# Patient Record
Sex: MALE | Race: WHITE | Employment: FULL TIME | URBAN - METROPOLITAN AREA
[De-identification: names, ages, dates, MRNs, and addresses within clinical notes are randomized per-mention and may not be internally consistent; named-entity substitution may affect disease eponyms.]

---

## 2018-07-02 ENCOUNTER — HOSPITAL ENCOUNTER (OUTPATIENT)
Age: 23
Discharge: HOME | End: 2018-07-02
Payer: MEDICAID

## 2018-11-14 ENCOUNTER — HOSPITAL ENCOUNTER (EMERGENCY)
Age: 23
Discharge: ANOTHER HOSPITAL | End: 2018-11-14
Attending: EMERGENCY MEDICINE
Payer: COMMERCIAL

## 2018-11-14 ENCOUNTER — APPOINTMENT (OUTPATIENT)
Dept: GENERAL RADIOLOGY | Age: 23
End: 2018-11-14
Payer: COMMERCIAL

## 2018-11-14 VITALS
BODY MASS INDEX: 21.47 KG/M2 | OXYGEN SATURATION: 98 % | TEMPERATURE: 98.2 F | SYSTOLIC BLOOD PRESSURE: 140 MMHG | HEART RATE: 99 BPM | WEIGHT: 150 LBS | HEIGHT: 70 IN | RESPIRATION RATE: 20 BRPM | DIASTOLIC BLOOD PRESSURE: 77 MMHG

## 2018-11-14 DIAGNOSIS — T50.902A INTENTIONAL DRUG OVERDOSE, INITIAL ENCOUNTER (HCC): Primary | ICD-10-CM

## 2018-11-14 DIAGNOSIS — R45.851 SUICIDAL IDEATION: ICD-10-CM

## 2018-11-14 LAB
ACETAMINOPHEN LEVEL: <5 MCG/ML (ref 10–30)
ALBUMIN SERPL-MCNC: 4.9 G/DL (ref 3.5–5.2)
ALP BLD-CCNC: 77 U/L (ref 40–129)
ALT SERPL-CCNC: 9 U/L (ref 0–40)
AMPHETAMINE SCREEN, URINE: POSITIVE
ANION GAP SERPL CALCULATED.3IONS-SCNC: 17 MMOL/L (ref 7–16)
AST SERPL-CCNC: 15 U/L (ref 0–39)
BARBITURATE SCREEN URINE: NOT DETECTED
BASOPHILS ABSOLUTE: 0.03 E9/L (ref 0–0.2)
BASOPHILS RELATIVE PERCENT: 0.3 % (ref 0–2)
BENZODIAZEPINE SCREEN, URINE: NOT DETECTED
BILIRUB SERPL-MCNC: 0.5 MG/DL (ref 0–1.2)
BILIRUBIN DIRECT: <0.2 MG/DL (ref 0–0.3)
BILIRUBIN, INDIRECT: NORMAL MG/DL (ref 0–1)
BUN BLDV-MCNC: 5 MG/DL (ref 6–20)
CALCIUM SERPL-MCNC: 9.6 MG/DL (ref 8.6–10.2)
CANNABINOID SCREEN URINE: POSITIVE
CHLORIDE BLD-SCNC: 95 MMOL/L (ref 98–107)
CHP ED QC CHECK: NORMAL
CO2: 24 MMOL/L (ref 22–29)
COCAINE METABOLITE SCREEN URINE: NOT DETECTED
CREAT SERPL-MCNC: 0.8 MG/DL (ref 0.7–1.2)
EKG ATRIAL RATE: 109 BPM
EKG P AXIS: 70 DEGREES
EKG P-R INTERVAL: 194 MS
EKG Q-T INTERVAL: 326 MS
EKG QRS DURATION: 84 MS
EKG QTC CALCULATION (BAZETT): 439 MS
EKG R AXIS: 85 DEGREES
EKG T AXIS: 52 DEGREES
EKG VENTRICULAR RATE: 109 BPM
EOSINOPHILS ABSOLUTE: 0.02 E9/L (ref 0.05–0.5)
EOSINOPHILS RELATIVE PERCENT: 0.2 % (ref 0–6)
ETHANOL: <10 MG/DL (ref 0–0.08)
GFR AFRICAN AMERICAN: >60
GFR NON-AFRICAN AMERICAN: >60 ML/MIN/1.73
GLUCOSE BLD-MCNC: 112 MG/DL
GLUCOSE BLD-MCNC: 112 MG/DL (ref 74–99)
HCT VFR BLD CALC: 47.2 % (ref 37–54)
HEMOGLOBIN: 16.5 G/DL (ref 12.5–16.5)
IMMATURE GRANULOCYTES #: 0.06 E9/L
IMMATURE GRANULOCYTES %: 0.6 % (ref 0–5)
LIPASE: 10 U/L (ref 13–60)
LYMPHOCYTES ABSOLUTE: 1.33 E9/L (ref 1.5–4)
LYMPHOCYTES RELATIVE PERCENT: 12.2 % (ref 20–42)
MAGNESIUM: 1.9 MG/DL (ref 1.6–2.6)
MCH RBC QN AUTO: 29.6 PG (ref 26–35)
MCHC RBC AUTO-ENTMCNC: 35 % (ref 32–34.5)
MCV RBC AUTO: 84.6 FL (ref 80–99.9)
METHADONE SCREEN, URINE: NOT DETECTED
MONOCYTES ABSOLUTE: 1.22 E9/L (ref 0.1–0.95)
MONOCYTES RELATIVE PERCENT: 11.2 % (ref 2–12)
NEUTROPHILS ABSOLUTE: 8.2 E9/L (ref 1.8–7.3)
NEUTROPHILS RELATIVE PERCENT: 75.5 % (ref 43–80)
OPIATE SCREEN URINE: NOT DETECTED
PDW BLD-RTO: 12 FL (ref 11.5–15)
PHENCYCLIDINE SCREEN URINE: NOT DETECTED
PLATELET # BLD: 338 E9/L (ref 130–450)
PMV BLD AUTO: 9.4 FL (ref 7–12)
POTASSIUM SERPL-SCNC: 3.3 MMOL/L (ref 3.5–5)
PROPOXYPHENE SCREEN: NOT DETECTED
RBC # BLD: 5.58 E12/L (ref 3.8–5.8)
SALICYLATE, SERUM: <0.3 MG/DL (ref 0–30)
SODIUM BLD-SCNC: 136 MMOL/L (ref 132–146)
TOTAL CK: 76 U/L (ref 20–200)
TOTAL PROTEIN: 8.1 G/DL (ref 6.4–8.3)
TRICYCLIC ANTIDEPRESSANTS SCREEN SERUM: NEGATIVE NG/ML
TROPONIN: <0.01 NG/ML (ref 0–0.03)
TSH SERPL DL<=0.05 MIU/L-ACNC: 2.22 UIU/ML (ref 0.27–4.2)
WBC # BLD: 10.9 E9/L (ref 4.5–11.5)

## 2018-11-14 PROCEDURE — 93005 ELECTROCARDIOGRAM TRACING: CPT | Performed by: EMERGENCY MEDICINE

## 2018-11-14 PROCEDURE — 80048 BASIC METABOLIC PNL TOTAL CA: CPT

## 2018-11-14 PROCEDURE — 80076 HEPATIC FUNCTION PANEL: CPT

## 2018-11-14 PROCEDURE — 82550 ASSAY OF CK (CPK): CPT

## 2018-11-14 PROCEDURE — 99284 EMERGENCY DEPT VISIT MOD MDM: CPT

## 2018-11-14 PROCEDURE — 96374 THER/PROPH/DIAG INJ IV PUSH: CPT

## 2018-11-14 PROCEDURE — G0480 DRUG TEST DEF 1-7 CLASSES: HCPCS

## 2018-11-14 PROCEDURE — 84484 ASSAY OF TROPONIN QUANT: CPT

## 2018-11-14 PROCEDURE — 83690 ASSAY OF LIPASE: CPT

## 2018-11-14 PROCEDURE — 83735 ASSAY OF MAGNESIUM: CPT

## 2018-11-14 PROCEDURE — 6370000000 HC RX 637 (ALT 250 FOR IP): Performed by: EMERGENCY MEDICINE

## 2018-11-14 PROCEDURE — 71045 X-RAY EXAM CHEST 1 VIEW: CPT

## 2018-11-14 PROCEDURE — 80307 DRUG TEST PRSMV CHEM ANLYZR: CPT

## 2018-11-14 PROCEDURE — 2580000003 HC RX 258: Performed by: EMERGENCY MEDICINE

## 2018-11-14 PROCEDURE — 84443 ASSAY THYROID STIM HORMONE: CPT

## 2018-11-14 PROCEDURE — 85025 COMPLETE CBC W/AUTO DIFF WBC: CPT

## 2018-11-14 PROCEDURE — 6360000002 HC RX W HCPCS: Performed by: EMERGENCY MEDICINE

## 2018-11-14 RX ORDER — SODIUM CHLORIDE 9 MG/ML
INJECTION, SOLUTION INTRAVENOUS ONCE
Status: COMPLETED | OUTPATIENT
Start: 2018-11-14 | End: 2018-11-14

## 2018-11-14 RX ORDER — DEXTROAMPHETAMINE SACCHARATE, AMPHETAMINE ASPARTATE, DEXTROAMPHETAMINE SULFATE AND AMPHETAMINE SULFATE 7.5; 7.5; 7.5; 7.5 MG/1; MG/1; MG/1; MG/1
30 TABLET ORAL DAILY
COMMUNITY

## 2018-11-14 RX ORDER — POTASSIUM CHLORIDE 20 MEQ/1
40 TABLET, EXTENDED RELEASE ORAL ONCE
Status: COMPLETED | OUTPATIENT
Start: 2018-11-14 | End: 2018-11-14

## 2018-11-14 RX ORDER — LORAZEPAM 2 MG/ML
1 INJECTION INTRAMUSCULAR ONCE
Status: COMPLETED | OUTPATIENT
Start: 2018-11-14 | End: 2018-11-14

## 2018-11-14 RX ADMIN — SODIUM CHLORIDE: 9 INJECTION, SOLUTION INTRAVENOUS at 10:18

## 2018-11-14 RX ADMIN — LORAZEPAM 1 MG: 2 INJECTION INTRAMUSCULAR; INTRAVENOUS at 10:21

## 2018-11-14 RX ADMIN — SODIUM CHLORIDE: 9 INJECTION, SOLUTION INTRAVENOUS at 10:19

## 2018-11-14 RX ADMIN — POTASSIUM CHLORIDE 40 MEQ: 20 TABLET, EXTENDED RELEASE ORAL at 17:14

## 2018-11-14 ASSESSMENT — ENCOUNTER SYMPTOMS
EYE DISCHARGE: 0
SINUS PRESSURE: 0
NAUSEA: 0
DIARRHEA: 0
EYE REDNESS: 0
BACK PAIN: 0
VOMITING: 0
COUGH: 0
ABDOMINAL PAIN: 0
SORE THROAT: 0
EYE PAIN: 0
WHEEZING: 0
SHORTNESS OF BREATH: 0

## 2018-11-14 NOTE — ED PROVIDER NOTES
Patient is a 81-year-old male presenting to ED with complaint of drug overdose. Patient took 9 pills of 30 mg Adderall tabs. Patient took them over the course of 6 hours states that he recently was kicked out of school and recently obtained a job though has felt list. Patient has been clinically depressed is a history of bipolar disorder. Patient states that no medications for bipolar disorder though he sees a consult on a regular basis. Patient reports that tonight with his regular dose of 30 mg Adderall twice patient states he continued to take an additional pill every half-hour patient took a total of 9 pills. DT never felt symptoms of that though 4 hours prior to arrival he stopped taking the medication as he decided he did not want to harm himself. Patient waited till his parents were awake notified them of his medication intake and was brought to the ED. Patient denies having any symptoms currently denies nausea, fever, dictations. Patient has never attempted to kill himself in the past.            Review of Systems   Constitutional: Negative for chills and fever. HENT: Negative for ear pain, sinus pressure and sore throat. Eyes: Negative for pain, discharge and redness. Respiratory: Negative for cough, shortness of breath and wheezing. Cardiovascular: Negative for chest pain. Gastrointestinal: Negative for abdominal pain, diarrhea, nausea and vomiting. Genitourinary: Negative for dysuria and frequency. Musculoskeletal: Negative for arthralgias and back pain. Skin: Negative for rash and wound. Neurological: Negative for weakness and headaches. Hematological: Negative for adenopathy. Psychiatric/Behavioral: Positive for self-injury and suicidal ideas. All other systems reviewed and are negative. Physical Exam   Constitutional: He is oriented to person, place, and time. He appears well-developed and well-nourished. No distress. HENT:   Head: Normocephalic and atraumatic.

## 2018-11-14 NOTE — ED NOTES
This writer spoke with Dean Marrero of poison control, updated vitals and lab levels provided, Dean Marrero stated that since vitals are stable and acetaminophen level negative that it would be a 6 hr observation for the pt, this was relayed to dr Gilberto Luna, RN  11/14/18 [id number]

## 2018-11-19 LAB
AMPHETAMINES, URINE: >5000 NG/ML
CANNABINOIDS CONF, URINE: 29 NG/ML
METHAMPHETAMINE, URINE: <200 NG/ML
METHYLENEDIOXYAMPHETAMINE, UR: <200 NG/ML
METHYLENEDIOXYETHYLAMPHETAMINE, UR: <200 NG/ML
METHYLENEDIOXYMETHAMPHETAMINE, UR: <200 NG/ML

## 2020-07-29 ENCOUNTER — APPOINTMENT (OUTPATIENT)
Dept: GENERAL RADIOLOGY | Age: 25
End: 2020-07-29
Payer: COMMERCIAL

## 2020-07-29 ENCOUNTER — HOSPITAL ENCOUNTER (EMERGENCY)
Age: 25
Discharge: HOME | End: 2020-07-29
Attending: EMERGENCY MEDICINE
Payer: COMMERCIAL

## 2020-07-29 VITALS
HEART RATE: 88 BPM | BODY MASS INDEX: 21.47 KG/M2 | TEMPERATURE: 98.8 F | HEIGHT: 70 IN | SYSTOLIC BLOOD PRESSURE: 144 MMHG | DIASTOLIC BLOOD PRESSURE: 91 MMHG | RESPIRATION RATE: 16 BRPM | WEIGHT: 150 LBS | OXYGEN SATURATION: 99 %

## 2020-07-29 LAB
ALBUMIN SERPL-MCNC: 4.8 G/DL (ref 3.5–5.2)
ALP BLD-CCNC: 89 U/L (ref 40–129)
ALT SERPL-CCNC: 14 U/L (ref 0–40)
ANION GAP SERPL CALCULATED.3IONS-SCNC: 11 MMOL/L (ref 7–16)
AST SERPL-CCNC: 18 U/L (ref 0–39)
BASOPHILS ABSOLUTE: 0.03 E9/L (ref 0–0.2)
BASOPHILS RELATIVE PERCENT: 0.6 % (ref 0–2)
BILIRUB SERPL-MCNC: 0.6 MG/DL (ref 0–1.2)
BUN BLDV-MCNC: 9 MG/DL (ref 6–20)
CALCIUM SERPL-MCNC: 9.7 MG/DL (ref 8.6–10.2)
CHLORIDE BLD-SCNC: 104 MMOL/L (ref 98–107)
CO2: 26 MMOL/L (ref 22–29)
CREAT SERPL-MCNC: 0.9 MG/DL (ref 0.7–1.2)
D DIMER: <200 NG/ML DDU
EOSINOPHILS ABSOLUTE: 0.04 E9/L (ref 0.05–0.5)
EOSINOPHILS RELATIVE PERCENT: 0.8 % (ref 0–6)
GFR AFRICAN AMERICAN: >60
GFR NON-AFRICAN AMERICAN: >60 ML/MIN/1.73
GLUCOSE BLD-MCNC: 109 MG/DL (ref 74–99)
HCT VFR BLD CALC: 46.6 % (ref 37–54)
HEMOGLOBIN: 15.7 G/DL (ref 12.5–16.5)
IMMATURE GRANULOCYTES #: 0.03 E9/L
IMMATURE GRANULOCYTES %: 0.6 % (ref 0–5)
LYMPHOCYTES ABSOLUTE: 1.46 E9/L (ref 1.5–4)
LYMPHOCYTES RELATIVE PERCENT: 27.5 % (ref 20–42)
MCH RBC QN AUTO: 30.5 PG (ref 26–35)
MCHC RBC AUTO-ENTMCNC: 33.7 % (ref 32–34.5)
MCV RBC AUTO: 90.5 FL (ref 80–99.9)
MONOCYTES ABSOLUTE: 0.58 E9/L (ref 0.1–0.95)
MONOCYTES RELATIVE PERCENT: 10.9 % (ref 2–12)
NEUTROPHILS ABSOLUTE: 3.17 E9/L (ref 1.8–7.3)
NEUTROPHILS RELATIVE PERCENT: 59.6 % (ref 43–80)
PDW BLD-RTO: 12.8 FL (ref 11.5–15)
PLATELET # BLD: 307 E9/L (ref 130–450)
PMV BLD AUTO: 9.9 FL (ref 7–12)
POTASSIUM REFLEX MAGNESIUM: 4.3 MMOL/L (ref 3.5–5)
RBC # BLD: 5.15 E12/L (ref 3.8–5.8)
SODIUM BLD-SCNC: 141 MMOL/L (ref 132–146)
TOTAL PROTEIN: 7.2 G/DL (ref 6.4–8.3)
TROPONIN: <0.01 NG/ML (ref 0–0.03)
WBC # BLD: 5.3 E9/L (ref 4.5–11.5)

## 2020-07-29 PROCEDURE — 93005 ELECTROCARDIOGRAM TRACING: CPT | Performed by: NURSE PRACTITIONER

## 2020-07-29 PROCEDURE — 99285 EMERGENCY DEPT VISIT HI MDM: CPT

## 2020-07-29 PROCEDURE — 71046 X-RAY EXAM CHEST 2 VIEWS: CPT

## 2020-07-29 PROCEDURE — 85025 COMPLETE CBC W/AUTO DIFF WBC: CPT

## 2020-07-29 PROCEDURE — 85378 FIBRIN DEGRADE SEMIQUANT: CPT

## 2020-07-29 PROCEDURE — 84484 ASSAY OF TROPONIN QUANT: CPT

## 2020-07-29 PROCEDURE — 80053 COMPREHEN METABOLIC PANEL: CPT

## 2020-07-29 ASSESSMENT — ENCOUNTER SYMPTOMS
DIARRHEA: 0
CHEST TIGHTNESS: 1
BLOOD IN STOOL: 0
BACK PAIN: 0
ABDOMINAL PAIN: 0
CONSTIPATION: 0
RHINORRHEA: 0
SORE THROAT: 0
VOMITING: 0
COUGH: 0
SHORTNESS OF BREATH: 0
NAUSEA: 0

## 2020-07-29 ASSESSMENT — PAIN DESCRIPTION - DESCRIPTORS: DESCRIPTORS: PRESSURE

## 2020-07-29 ASSESSMENT — PAIN DESCRIPTION - FREQUENCY: FREQUENCY: CONTINUOUS

## 2020-07-29 ASSESSMENT — PAIN DESCRIPTION - LOCATION: LOCATION: CHEST

## 2020-07-29 ASSESSMENT — PAIN SCALES - GENERAL: PAINLEVEL_OUTOF10: 7

## 2020-07-29 NOTE — ED NOTES
Pt called for in waiting room multiple times bathroom checked.   Pt unable to be found at this time      Marcela Crespo RN  07/29/20 [id number]

## 2020-07-29 NOTE — ED PROVIDER NOTES
Reji Méndez is a 22 y.o. male with a PMHx significant for Anxiety and asthma who presents for evaluation of chest pain / pressure, beginning months ago. The complaint has been intermittent, moderate in severity, and worsened by nothing. The patient states that he has noticed chest pressure and discomfort on and off for the past few months. He states for the past 4 weeks is been getting progressively worse. He states it feels like chest pressure right behind his mid chest.  The pain does not radiate elsewhere, does not go to his back does not radiate down his arm does not cholesterol. He states it has been a daily occurrence and sometimes notices shortness of breath with it. Patient states this does not feel like his normal asthma, he notes that he also has anxiety and states is been getting worse as well. Patient denies any IV drug use, recent car rides, hemoptysis, recent surgery. The history is provided by the patient and medical records. Review of Systems   Constitutional: Negative for chills and fever. HENT: Negative for postnasal drip, rhinorrhea and sore throat. Eyes: Negative for visual disturbance. Respiratory: Positive for chest tightness. Negative for cough and shortness of breath. Cardiovascular: Positive for chest pain. Gastrointestinal: Negative for abdominal pain, blood in stool, constipation, diarrhea, nausea and vomiting. Genitourinary: Negative for difficulty urinating, dysuria and urgency. Musculoskeletal: Negative for back pain. Skin: Negative for rash. Neurological: Negative for dizziness, numbness and headaches. Physical Exam  Vitals signs and nursing note reviewed. Constitutional:       Appearance: He is well-developed. HENT:      Head: Normocephalic and atraumatic. Right Ear: External ear normal.      Left Ear: External ear normal.   Eyes:      General:         Right eye: No discharge. Left eye: No discharge.       Extraocular Movements: Extraocular movements intact. Conjunctiva/sclera: Conjunctivae normal.   Neck:      Musculoskeletal: Normal range of motion and neck supple. Cardiovascular:      Rate and Rhythm: Normal rate and regular rhythm. Heart sounds: Normal heart sounds. No murmur. Pulmonary:      Effort: Pulmonary effort is normal. No respiratory distress. Breath sounds: Normal breath sounds. No stridor. No wheezing. Abdominal:      General: There is no distension. Palpations: Abdomen is soft. There is no mass. Tenderness: There is no abdominal tenderness. There is no rebound. Musculoskeletal:         General: No swelling. Skin:     General: Skin is warm and dry. Coloration: Skin is not jaundiced or pale. Neurological:      General: No focal deficit present. Mental Status: He is alert and oriented to person, place, and time. Cranial Nerves: No cranial nerve deficit. Coordination: Coordination normal.          Procedures     MDM     PERC Rule for PE:      Age ? 50 negative     HR ? 100 negative     O2 Sat on Room Air < 95% negative     Prior History of DVT/PE negative     Recent Trauma or Surgery negative     Hemoptysis negative     Exogenous Estrogen/Hormone Use negative     Unilateral Extgremity Swelling negative     * If ANY Criteria are positive, the PERC rule is not satisfied and cannot be used to rule out PE in this patient. ED Course as of Jul 29 1522   Wed Jul 29, 2020   1500 EKG: This EKG is signed and interpreted by me. Rate: 68  Rhythm: Sinus  Interpretation: no acute changes and non-specific EKG, no ST elevations or depressions, inverted T waves in V1, QTC of 399  Comparison: Non-specific changes compared to previous EKG 11/14/2018; less artifact. [BB]   1518 Went to reevaluate the patient, he is lying in bed in no acute distress. Discussed today's findings and results. He is agreeable to treatment plan of following up with his PCP.   Mother at bedside.    [BB]      ED Course User Index  [BB] DO Wilbur Collier presents to the ED for evaluation of chest discomfort. Patient was seen at outside facility and sent for further evaluation. Differential diagnoses included but not limited to Pericarditis, CAD, PE, chest wall pain. Workup in the ED revealed negative CXR and labs. Patient with negative D-dimer. Patient continues to be non-toxic on re-evaluation. Findings were discussed with the patient and reasons to immediately return to the ED were articulated to them. They will follow-up with their PCP.    --------------------------------------------- PAST HISTORY ---------------------------------------------  Past Medical History:  has a past medical history of ADHD, Anxiety, and Bipolar 1 disorder ([facility] [address] Past Surgical History:  has no past surgical history on file. Social History:  reports that he has been smoking cigars. He has never used smokeless tobacco. He reports current alcohol use. He reports current drug use. Drug: Marijuana. Family History: family history is not on file. The patients home medications have been reviewed. Allergies: Patient has no known allergies.     -------------------------------------------------- RESULTS -------------------------------------------------  Labs:  Results for orders placed or performed during the hospital encounter of 07/29/20   CBC Auto Differential   Result Value Ref Range    WBC 5.3 4.5 - 11.5 E9/L    RBC 5.15 3.80 - 5.80 E12/L    Hemoglobin 15.7 12.5 - 16.5 g/dL    Hematocrit 46.6 37.0 - 54.0 %    MCV 90.5 80.0 - 99.9 fL    MCH 30.5 26.0 - 35.0 pg    MCHC 33.7 32.0 - 34.5 %    RDW 12.8 11.5 - 15.0 fL    Platelets 174 175 - 583 E9/L    MPV 9.9 7.0 - 12.0 fL    Neutrophils % 59.6 43.0 - 80.0 %    Immature Granulocytes % 0.6 0.0 - 5.0 %    Lymphocytes % 27.5 20.0 - 42.0 %    Monocytes % 10.9 2.0 - 12.0 %    Eosinophils % 0.8 0.0 - 6.0 %    Basophils % 0.6 0.0 - 2.0 % Neutrophils Absolute 3.17 1.80 - 7.30 E9/L    Immature Granulocytes # 0.03 E9/L    Lymphocytes Absolute 1.46 (L) 1.50 - 4.00 E9/L    Monocytes Absolute 0.58 0.10 - 0.95 E9/L    Eosinophils Absolute 0.04 (L) 0.05 - 0.50 E9/L    Basophils Absolute 0.03 0.00 - 0.20 E9/L   Comprehensive Metabolic Panel w/ Reflex to MG   Result Value Ref Range    Sodium 141 132 - 146 mmol/L    Potassium reflex Magnesium 4.3 3.5 - 5.0 mmol/L    Chloride 104 98 - 107 mmol/L    CO2 26 22 - 29 mmol/L    Anion Gap 11 7 - 16 mmol/L    Glucose 109 (H) 74 - 99 mg/dL    BUN 9 6 - 20 mg/dL    CREATININE 0.9 0.7 - 1.2 mg/dL    GFR Non-African American >60 >=60 mL/min/1.73    GFR African American >60     Calcium 9.7 8.6 - 10.2 mg/dL    Total Protein 7.2 6.4 - 8.3 g/dL    Alb 4.8 3.5 - 5.2 g/dL    Total Bilirubin 0.6 0.0 - 1.2 mg/dL    Alkaline Phosphatase 89 40 - 129 U/L    ALT 14 0 - 40 U/L    AST 18 0 - 39 U/L   Troponin   Result Value Ref Range    Troponin <0.01 0.00 - 0.03 ng/mL   D-dimer, quantitative   Result Value Ref Range    D-Dimer, Quant <200 ng/mL DDU       Radiology:  XR CHEST (2 VW)   Final Result   No sign of acute pulmonary process. ------------------------- NURSING NOTES AND VITALS REVIEWED ---------------------------  Date / Time Roomed:  7/29/2020 12:31 PM  ED Bed Assignment:  [facility]/    The nursing notes within the ED encounter and vital signs as below have been reviewed. BP (!) 144/91   Pulse 88   Temp 98.8 °F (37.1 °C) (Temporal)   Resp 16   Ht 5' 10\" (1.778 m)   Wt 150 lb (68 kg)   SpO2 99%   BMI 21.52 kg/m²   Oxygen Saturation Interpretation: Normal      ------------------------------------------ PROGRESS NOTES ------------------------------------------  4:39 PM EDT  I have spoken with the patient and discussed todays results, in addition to providing specific details for the plan of care and counseling regarding the diagnosis and prognosis.   Their questions are answered at this time and they are agreeable with the plan. I discussed at length with them reasons for immediate return here for re evaluation. They will followup with their primary care physician by calling their office tomorrow. --------------------------------- ADDITIONAL PROVIDER NOTES ---------------------------------  At this time the patient is without objective evidence of an acute process requiring hospitalization or inpatient management. They have remained hemodynamically stable throughout their entire ED visit and are stable for discharge with outpatient follow-up. The plan has been discussed in detail and they are aware of the specific conditions for emergent return, as well as the importance of follow-up. Discharge Medication List as of 7/29/2020  3:18 PM          Diagnosis:  1. Chest pain, unspecified type        Disposition:  Patient's disposition: Discharge to home  Patient's condition is stable.            Angel Cortez DO  Resident  07/29/20 1640

## 2020-07-31 LAB
EKG ATRIAL RATE: 68 BPM
EKG P AXIS: 80 DEGREES
EKG P-R INTERVAL: 152 MS
EKG Q-T INTERVAL: 376 MS
EKG QRS DURATION: 98 MS
EKG QTC CALCULATION (BAZETT): 399 MS
EKG R AXIS: 86 DEGREES
EKG T AXIS: 70 DEGREES
EKG VENTRICULAR RATE: 68 BPM

## 2022-01-30 ENCOUNTER — HOSPITAL ENCOUNTER (EMERGENCY)
Age: 27
Discharge: HOME | End: 2022-01-30
Attending: STUDENT IN AN ORGANIZED HEALTH CARE EDUCATION/TRAINING PROGRAM
Payer: MEDICAID

## 2022-01-30 VITALS
WEIGHT: 140 LBS | BODY MASS INDEX: 20.04 KG/M2 | OXYGEN SATURATION: 98 % | HEART RATE: 87 BPM | RESPIRATION RATE: 14 BRPM | DIASTOLIC BLOOD PRESSURE: 58 MMHG | SYSTOLIC BLOOD PRESSURE: 116 MMHG | TEMPERATURE: 97.2 F | HEIGHT: 70 IN

## 2022-01-30 DIAGNOSIS — F41.0 ANXIETY ATTACK: Primary | ICD-10-CM

## 2022-01-30 PROCEDURE — 6370000000 HC RX 637 (ALT 250 FOR IP): Performed by: STUDENT IN AN ORGANIZED HEALTH CARE EDUCATION/TRAINING PROGRAM

## 2022-01-30 PROCEDURE — 99285 EMERGENCY DEPT VISIT HI MDM: CPT

## 2022-01-30 RX ORDER — HYDROXYZINE 50 MG/1
50 TABLET, FILM COATED ORAL EVERY 8 HOURS PRN
Qty: 15 TABLET | Refills: 0 | Status: SHIPPED | OUTPATIENT
Start: 2022-01-30 | End: 2022-02-04

## 2022-01-30 RX ORDER — HYDROXYZINE HYDROCHLORIDE 10 MG/1
50 TABLET, FILM COATED ORAL ONCE
Status: COMPLETED | OUTPATIENT
Start: 2022-01-30 | End: 2022-01-30

## 2022-01-30 RX ADMIN — HYDROXYZINE HYDROCHLORIDE 50 MG: 10 TABLET, FILM COATED ORAL at 12:39

## 2022-01-30 ASSESSMENT — ENCOUNTER SYMPTOMS
NAUSEA: 0
ABDOMINAL PAIN: 0
VOMITING: 0
DIARRHEA: 0
RHINORRHEA: 0
SHORTNESS OF BREATH: 0

## 2022-01-30 NOTE — ED NOTES
Pt alert states that he feels very anxious today he states that he is due to see a doctor on feb 8th regarding this anxiety, he states that he currently does not have a plan to hurt himself, but he would do anything to get rid of the anxiety.      Bishop Glenna RN  01/30/22 0690

## 2022-01-30 NOTE — ED PROVIDER NOTES
Patient is a 23-year-old male with a history of anxiety symptoms who presents to the emergency department with described anxiety. Patient states he has had symptoms for greater than 5 years, he states they are daily. Today he was driving in his car when he felt overwhelming anxiety including palpitations and racing thoughts. Patient denies any SI or HI, denies any auditory or visual hallucinations. Patient denies any self-harm. Patient states that he has had anxiety symptoms in the past and was treated 5 years ago with a trial of Seroquel which he self stopped after less than a month. Patient is not followed by outpatient psychiatry. Patient is to establish with a slight outpatient psychiatry in [facility] coming up. Patient is not currently on a daily anxiolytic. Patient denies any chest pain or shortness of breath, denies any abdominal pain, any other symptoms. Patient denies any neuro deficits. Patient denies any trauma. Review of Systems   Constitutional: Negative for chills and fever. HENT: Negative for congestion and rhinorrhea. Eyes: Negative for visual disturbance. Respiratory: Negative for shortness of breath. Cardiovascular: Negative for chest pain. Gastrointestinal: Negative for abdominal pain, diarrhea, nausea and vomiting. Endocrine: Negative for polyuria. Genitourinary: Negative for dysuria, frequency, hematuria and urgency. Musculoskeletal: Negative for arthralgias and myalgias. Skin: Negative for rash and wound. Allergic/Immunologic: Negative for immunocompromised state. Neurological: Negative for dizziness, syncope, weakness, light-headedness, numbness and headaches. Psychiatric/Behavioral: Negative for agitation, confusion, hallucinations, self-injury and suicidal ideas. The patient is nervous/anxious. Physical Exam  Vitals and nursing note reviewed. Constitutional:       General: He is not in acute distress.      Appearance: He is not ill-appearing or toxic-appearing. HENT:      Head: Normocephalic and atraumatic. Mouth/Throat:      Mouth: Mucous membranes are moist.      Pharynx: Oropharynx is clear. Eyes:      Pupils: Pupils are equal, round, and reactive to light. Cardiovascular:      Rate and Rhythm: Normal rate and regular rhythm. Pulses: Normal pulses. Heart sounds: Normal heart sounds. Pulmonary:      Effort: Pulmonary effort is normal.      Breath sounds: Normal breath sounds. Abdominal:      General: There is no distension. Palpations: Abdomen is soft. Tenderness: There is no abdominal tenderness. Musculoskeletal:         General: Normal range of motion. Skin:     General: Skin is warm and dry. Capillary Refill: Capillary refill takes less than 2 seconds. Neurological:      General: No focal deficit present. Mental Status: He is alert and oriented to person, place, and time. GCS: GCS eye subscore is 4. GCS verbal subscore is 5. GCS motor subscore is 6. Sensory: Sensation is intact. No sensory deficit. Motor: Motor function is intact. Psychiatric:         Mood and Affect: Mood is anxious. Speech: Speech is rapid and pressured. Speech is not slurred or tangential.         Thought Content: Thought content does not include homicidal or suicidal ideation. Thought content does not include homicidal or suicidal plan. MDM  Number of Diagnoses or Management Options  Anxiety attack  Diagnosis management comments: Patient is a 80-year-old male who presents to the emergency department with complaint of anxiety. Patient has a history of anxiety symptoms, is going to establish with psychiatry. Patient is not currently on any anxiety lytics. Patient complains of palpitations and feeling of anxiety. Patient denies SI or HI. Patient presents awake, alert, anxious. Vital signs are noted. Physical exam is unremarkable. EKG shows no acute findings.   Patient's symptoms are consistent with anxiety. Patient was agreeable to try anxiolytic including Vistaril, which was given and patient was reevaluated during ED monitoring positive improvement. Patient was feeling more comfortable. Patient plans on following up outpatient. Patient is agreeable to plan of discharge home with home monitoring and and Atarax as needed for continued symptoms. Patient will call to try to get into an appointment sooner outpatient psychiatry. Plan was discussed with patient and father at bedside, they are both agreeable. Return instructions were given for the emergency department including SI and HI. Patient is agreeable to plan       EKG: This EKG is signed and interpreted by me. Rate: 64  Rhythm: Sinus  Interpretation: sinus rhythm, normal W[place] interval, normal QRS, normal QT interval, no acute ST or T wave changes. Signs of early repol. Comparison: stable as compared to patient's most recent EKG     --------------------------------------------- PAST HISTORY ---------------------------------------------  Past Medical History:  has a past medical history of ADHD, Anxiety, and Bipolar 1 disorder ([address] Past Surgical History:  has no past surgical history on file. Social History:  reports that he has been smoking cigars. He has never used smokeless tobacco. He reports current alcohol use. He reports current drug use. Drug: Marijuana Kishore Sneha). Family History: family history is not on file. The patients home medications have been reviewed. Allergies: Patient has no known allergies.     -------------------------------------------------- RESULTS -------------------------------------------------  Labs:  Results for orders placed or performed during the hospital encounter of 01/30/22   EKG 12 Lead   Result Value Ref Range    Ventricular Rate 64 BPM    Atrial Rate 64 BPM    P-R Interval 160 ms    QRS Duration 98 ms    Q-T Interval 416 ms    QTc Calculation (Bazett) 429 ms    P Axis 79 degrees    R Axis 82 degrees    T Axis 71 degrees       Radiology:  No orders to display     ------------------------- NURSING NOTES AND VITALS REVIEWED ---------------------------  Date / Time Roomed:  1/30/2022 11:36 AM  ED Bed Assignment:  02/02    The nursing notes within the ED encounter and vital signs as below have been reviewed. BP (!) 116/58   Pulse 87   Temp 97.2 °F (36.2 °C)   Resp 14   Ht 5' 10\" (1.778 m)   Wt 140 lb (63.5 kg)   SpO2 98%   BMI 20.09 kg/m²   Oxygen Saturation Interpretation: Normal      ------------------------------------------ PROGRESS NOTES ------------------------------------------  12:23 PM EST  I have spoken with the patient and discussed todays results, in addition to providing specific details for the plan of care and counseling regarding the diagnosis and prognosis. Their questions are answered at this time and they are agreeable with the plan. I discussed at length with them reasons for immediate return here for re evaluation. They will followup with outpatient psych to establish as previously referred. --------------------------------- ADDITIONAL PROVIDER NOTES ---------------------------------  At this time the patient is without objective evidence of an acute process requiring hospitalization or inpatient management. They have remained hemodynamically stable throughout their entire ED visit and are stable for discharge with outpatient follow-up. The plan has been discussed in detail and they are aware of the specific conditions for emergent return, as well as the importance of follow-up. Discharge Medication List as of 1/30/2022  1:24 PM      START taking these medications    Details   hydrOXYzine (ATARAX) 50 MG tablet Take 1 tablet by mouth every 8 hours as needed for Anxiety, Disp-15 tablet, R-0Print           Diagnosis:  1. Anxiety attack      Disposition:  Patient's disposition: Discharge to home  Patient's condition is stable.     1/30/22, 12:23 PM EST.    This note is prepared by Claribel Beltran MD -PGY-2                   Claribel Beltran MD  Resident  01/30/22 9717

## 2022-01-30 NOTE — ED NOTES
Reviewed discharge instructions with patient, discussed medications and addressed all patient and family questions/concerns. Pt verbalizes understanding.        Shyla Sinclair RN  01/30/22 2843

## 2022-01-30 NOTE — ED PROVIDER NOTES
He has never used smokeless tobacco. He reports current alcohol use. He reports current drug use. Drug: Marijuana Don Safer). Family History: family history is not on file. Unless otherwise noted, family history is non contributory    The patients home medications have been reviewed. Allergies: Patient has no known allergies. Physical Exam:  Constitutional: Appears in no distress  Head: Normocephalic  Eyes: No conjunctial injection  ENT: Moist mucous membranes  Neck: Trachea midline  Respiratory: Nonlabored respirations, no tachypnea  Cardiovascular: Regular rate. Regular rhythm. No murmurs, no gallops, no rubs. Gastrointestinal: Nonsistendended abdomen. Musculoskeletal: Moves all extremities  Skin: No diaphoresis on visualized skin  Neurologic: Alert, symmetric facies, no aphasia  Psychiatric: Acting appropriately, linear thought process, not responding to internal stimuli, displaying good judgment and insight, patient has no suicidal homicidal ideation, no suicidal plan. He has a good support system with family including his father who has anxiety. My Medical Decision Making:   Patient presents emerge department anxiety. He has no red flag symptoms before his presentation that would be concerning for acute psychiatric decompensation. No clinical evidence of suicidal or homicidal ideation. No concern for manic or depressive phase at this time. EKG was obtained because patient has some mild palpitations intermittently. EKG:  EKG interpreted myself as ventricular rate of 64 bpm there is J-point elevation throughout. No ST segment elevation or depression. T wave inversion noted in aVL. Normal axis. QRS durations within normal is a QT/QTc is normal.  There is compared to a prior tracing from July 2020 and unchanged from previous. Patient with discharged with an outpatient prescription for Vistaril.   He is given follow-up with psychiatry and can see his father's counselor as well.      IMPRESSION:   1. Anxiety attack        I, Dr. Shakeel Ackerman, am the primary provider of record. I directly supervised any procedures performed by the resident and was present for the procedure including all critical portions of the procedure. Shakeel Ackerman DO  Emergency Medicine    NOTE: This report was transcribed using voice recognition software.  Every effort was made to ensure accuracy; however, inadvertent computerized transcription errors may be present       Rachelle Welch DO  01/30/22 [id number]

## 2022-02-03 LAB
EKG ATRIAL RATE: 64 BPM
EKG P AXIS: 79 DEGREES
EKG P-R INTERVAL: 160 MS
EKG Q-T INTERVAL: 416 MS
EKG QRS DURATION: 98 MS
EKG QTC CALCULATION (BAZETT): 429 MS
EKG R AXIS: 82 DEGREES
EKG T AXIS: 71 DEGREES
EKG VENTRICULAR RATE: 64 BPM

## 2023-08-08 ENCOUNTER — APPOINTMENT (OUTPATIENT)
Dept: GENERAL RADIOLOGY | Age: 28
End: 2023-08-08
Payer: MEDICAID

## 2023-08-08 ENCOUNTER — HOSPITAL ENCOUNTER (EMERGENCY)
Age: 28
Discharge: HOME | End: 2023-08-08
Attending: STUDENT IN AN ORGANIZED HEALTH CARE EDUCATION/TRAINING PROGRAM
Payer: MEDICAID

## 2023-08-08 VITALS
OXYGEN SATURATION: 100 % | DIASTOLIC BLOOD PRESSURE: 77 MMHG | HEART RATE: 70 BPM | RESPIRATION RATE: 16 BRPM | SYSTOLIC BLOOD PRESSURE: 89 MMHG | TEMPERATURE: 97.6 F

## 2023-08-08 DIAGNOSIS — S62.639B OPEN FRACTURE OF TUFT OF DISTAL PHALANX OF FINGER: Primary | ICD-10-CM

## 2023-08-08 PROCEDURE — 90714 TD VACC NO PRESV 7 YRS+ IM: CPT

## 2023-08-08 PROCEDURE — 96374 THER/PROPH/DIAG INJ IV PUSH: CPT

## 2023-08-08 PROCEDURE — 73130 X-RAY EXAM OF HAND: CPT

## 2023-08-08 PROCEDURE — 6360000002 HC RX W HCPCS

## 2023-08-08 PROCEDURE — 99284 EMERGENCY DEPT VISIT MOD MDM: CPT

## 2023-08-08 PROCEDURE — 90471 IMMUNIZATION ADMIN: CPT

## 2023-08-08 PROCEDURE — 96375 TX/PRO/DX INJ NEW DRUG ADDON: CPT

## 2023-08-08 PROCEDURE — 11760 REPAIR OF NAIL BED: CPT

## 2023-08-08 PROCEDURE — 2580000003 HC RX 258

## 2023-08-08 RX ORDER — CEPHALEXIN 500 MG/1
500 CAPSULE ORAL 4 TIMES DAILY
Qty: 28 CAPSULE | Refills: 0 | Status: SHIPPED | OUTPATIENT
Start: 2023-08-08 | End: 2023-08-15

## 2023-08-08 RX ORDER — FENTANYL CITRATE 50 UG/ML
50 INJECTION, SOLUTION INTRAMUSCULAR; INTRAVENOUS ONCE
Status: COMPLETED | OUTPATIENT
Start: 2023-08-08 | End: 2023-08-08

## 2023-08-08 RX ORDER — LIDOCAINE HYDROCHLORIDE 10 MG/ML
10 INJECTION, SOLUTION INFILTRATION; PERINEURAL SEE ADMIN INSTRUCTIONS
Status: DISCONTINUED | OUTPATIENT
Start: 2023-08-08 | End: 2023-08-08 | Stop reason: HOSPADM

## 2023-08-08 RX ORDER — HYDROCODONE BITARTRATE AND ACETAMINOPHEN 5; 325 MG/1; MG/1
1 TABLET ORAL EVERY 6 HOURS PRN
Qty: 10 TABLET | Refills: 0 | Status: SHIPPED | OUTPATIENT
Start: 2023-08-08 | End: 2023-08-11

## 2023-08-08 RX ADMIN — CEFAZOLIN 2000 MG: 2 INJECTION, POWDER, FOR SOLUTION INTRAMUSCULAR; INTRAVENOUS at 15:15

## 2023-08-08 RX ADMIN — FENTANYL CITRATE 50 MCG: 50 INJECTION, SOLUTION INTRAMUSCULAR; INTRAVENOUS at 15:15

## 2023-08-08 RX ADMIN — CLOSTRIDIUM TETANI TOXOID ANTIGEN (FORMALDEHYDE INACTIVATED) AND CORYNEBACTERIUM DIPHTHERIAE TOXOID ANTIGEN (FORMALDEHYDE INACTIVATED) 0.5 ML: 5; 2 INJECTION, SUSPENSION INTRAMUSCULAR at 14:14

## 2023-08-08 ASSESSMENT — LIFESTYLE VARIABLES
HOW OFTEN DO YOU HAVE A DRINK CONTAINING ALCOHOL: NEVER
HOW MANY STANDARD DRINKS CONTAINING ALCOHOL DO YOU HAVE ON A TYPICAL DAY: PATIENT DOES NOT DRINK

## 2023-08-08 NOTE — ED PROVIDER NOTES
comfortable with management and treatment plans as discussed. ------------------------- Disposition ---------------------------    I have discussed with the patient the use and purpose of the following prescription medications outpatient after they pick it up from their pharmacy. Discharge Medication List as of 8/8/2023  6:01 PM        START taking these medications    Details   cephALEXin (KEFLEX) 500 MG capsule Take 1 capsule by mouth 4 times daily for 7 days, Disp-28 capsule, R-0Normal      HYDROcodone-acetaminophen (NORCO) 5-325 MG per tablet Take 1 tablet by mouth every 6 hours as needed for Pain for up to 3 days. Intended supply: 3 days. Take lowest dose possible to manage pain Max Daily Amount: 4 tablets, Disp-10 tablet, R-0Normal             Clinical Impression  1.  Open fracture of tuft of distal phalanx of finger          Disposition  Patient's disposition: Discharge to home       Devyn Kuhn MD  Resident  08/08/23 3214

## 2024-01-26 ENCOUNTER — HOSPITAL ENCOUNTER (EMERGENCY)
Age: 29
Discharge: HOME | End: 2024-01-26
Payer: MEDICAID

## 2024-01-26 VITALS
TEMPERATURE: 97.5 F | WEIGHT: 140 LBS | SYSTOLIC BLOOD PRESSURE: 119 MMHG | OXYGEN SATURATION: 99 % | HEIGHT: 70 IN | BODY MASS INDEX: 20.04 KG/M2 | DIASTOLIC BLOOD PRESSURE: 73 MMHG | HEART RATE: 69 BPM | RESPIRATION RATE: 16 BRPM

## 2024-01-26 DIAGNOSIS — S01.20XA OPEN WOUND OF NASAL CAVITY, INITIAL ENCOUNTER: Primary | ICD-10-CM

## 2024-01-26 PROCEDURE — 99283 EMERGENCY DEPT VISIT LOW MDM: CPT

## 2024-01-26 RX ORDER — ECHINACEA PURPUREA EXTRACT 125 MG
1 TABLET ORAL PRN
Qty: 1 EACH | Refills: 3 | Status: SHIPPED | OUTPATIENT
Start: 2024-01-26

## 2024-01-26 ASSESSMENT — PAIN - FUNCTIONAL ASSESSMENT: PAIN_FUNCTIONAL_ASSESSMENT: NONE - DENIES PAIN

## 2024-01-26 NOTE — DISCHARGE INSTR - COC
Continuity of Care Form    Patient Name: Dedrick Rodriguez   :  1995  MRN:  [id number]    Admit date:  2024  Discharge date:  ***    Code Status Order: No Order   Advance Directives:     Admitting Physician:  No admitting provider for patient encounter.  PCP: Roosevelt Yeung MD    Discharging Nurse: ***  Discharging Hospital Unit/Room#: 32/32  Discharging Unit Phone Number: ***    Emergency Contact:   Extended Emergency Contact Information  Primary Emergency Contact: Dedrick Rodriguez  Address: [address] [place] [address] [facility]  Home Phone: [phone]  Relation: Parent    Past Surgical History:  No past surgical history on file.    Immunization History:   Immunization History   Administered Date(s) Administered    TD 5LF, TENIVAC, (age 7y+), IM, 0.5mL 2023       Active Problems:  Patient Active Problem List   Diagnosis Code    Ruptured Bakers cyst M66.0    Hamstring tightness M62.89    Quadricep tightness M62.89       Isolation/Infection:   Isolation            No Isolation          Patient Infection Status       None to display            Nurse Assessment:  Last Vital Signs: /73   Pulse 69   Temp 97.5 °F (36.4 °C) (Oral)   Resp 16   Ht 1.778 m (5' 10\")   Wt 63.5 kg (140 lb)   SpO2 99%   BMI 20.09 kg/m²     Last documented pain score (0-10 scale):    Last Weight:   Wt Readings from Last 1 Encounters:   24 63.5 kg (140 lb)     Mental Status:  {IP PT MENTAL STATUS:69303}    IV Access:  { RAINA IV ACCESS:063402574}    Nursing Mobility/ADLs:  Walking   {CHP DME ADLs:522706732}  Transfer  {CHP DME ADLs:561220175}  Bathing  {CHP DME ADLs:164886528}  Dressing  {CHP DME ADLs:118254070}  Toileting  {CHP DME ADLs:728081897}  Feeding  {CHP DME ADLs:795827881}  Med Admin  {CHP DME ADLs:941623338}  Med Delivery   { RAINA MED Delivery:640888214}    Wound Care Documentation and Therapy:        Elimination:  Continence:   Bowel: {YES / NO:41267}  Bladder: {YES /

## 2024-01-26 NOTE — ED PROVIDER NOTES
ambulatory, moves all 4 extremities.    Lab / Imaging Results   (All laboratory and radiology results have been personally reviewed by myself)  Labs:  No results found for this visit on 01/26/24.  Imaging:  All Radiology results interpreted by Radiologist unless otherwise noted.  No orders to display     ED Course / Medical Decision Making   Medications - No data to display     Consult(s):   None    Procedure(s):   none    MDM:     28 y.o. old male who presents to the emergency department by private vehicle, for evaluation of wounds to bilateral nasal canals, specifically on the septum. No nasal bleeding.  States painful when he picks at them.  He states he does snort cocaine.     Vital signs normal, awake, alert, oriented, no distress.  GCS 15, ambulatory moves all 4 extremities.  He does have scabs and ulcerations to both sides of his nasal septum.  No bleeding.  Advised him to stop snorting cocaine.  Prescribed him mupirocin ointment to apply 3 times daily as well as Ocean nasal spray to keep his nasal passages moist.  Provided him with ENT follow-up.  Recommended that he calls them for further management.  Strict return precautions reviewed, all questions were answered, patient verbalized understanding and he was discharged.    Plan of Care/Counseling:  FARHAN Dave CNP reviewed today's visit with the patient in addition to providing specific details for the plan of care and counseling regarding the diagnosis and prognosis.  Questions are answered at this time and are agreeable with the plan.    Assessment     1. Open wound of nasal cavity, initial encounter      Plan   Discharged home.  Patient condition is stable    New Medications     New Prescriptions    MUPIROCIN (BACTROBAN) 2 % OINTMENT    Apply topically 3 times daily.    SODIUM CHLORIDE (OCEAN NASAL SPRAY) 0.65 % NASAL SPRAY    1 spray by Nasal route as needed for Congestion     Electronically signed by FARHAN Dave CNP   DD: 1/26/24  **This report

## 2024-01-26 NOTE — ED NOTES
Department of Emergency Medicine  FIRST PROVIDER TRIAGE NOTE             Independent MLP           1/26/24  10:52 AM EST    Date of Encounter: 1/26/24   MRN: [id number]      HPI: Dedrick Rodriguez is a 28 y.o. male who presents to the ED for OTHER (Per pt states he has nose scabs and indents in his nose that are painful)      ROS: Negative for cp or sob.    PE: Gen Appearance/Constitutional: alert  HEENT: NC/NT. PERRLA,  Airway patent.     Initial Plan of Care: All treatment areas with department are currently occupied. Plan to order/Initiate the following while awaiting opening in ED.  Initiate Treatment-Testing, Proceed toTreatment Area When Bed Available for ED Attending/MLP to Continue Care    Electronically signed by FARHAN Dave CNP   DD: 1/26/24      Shakeel Bey APRN - CNP  01/26/24 1052

## 2024-01-26 NOTE — DISCHARGE INSTRUCTIONS
STOP SNORTING COCAINE.  FOLLOW UP WITH ENT.  USE THE OINTMENT AS PRESCRIBED  USE THE NOSE SPRAY TO KEEP YOUR NASAL PASSAGES MOIST.

## 2024-01-31 ENCOUNTER — HOSPITAL ENCOUNTER (INPATIENT)
Age: 29
LOS: 4 days | Discharge: HOME | DRG: 885 | End: 2024-02-04
Attending: EMERGENCY MEDICINE | Admitting: PSYCHIATRY & NEUROLOGY
Payer: COMMERCIAL

## 2024-01-31 DIAGNOSIS — F39 MOOD DISORDER (HCC): Primary | ICD-10-CM

## 2024-01-31 PROBLEM — F31.9 BIPOLAR 1 DISORDER (HCC): Status: ACTIVE | Noted: 2024-01-31

## 2024-01-31 LAB
ALBUMIN SERPL-MCNC: 3.9 G/DL (ref 3.5–5.2)
ALP SERPL-CCNC: 60 U/L (ref 40–129)
ALT SERPL-CCNC: 10 U/L (ref 0–40)
AMPHET UR QL SCN: NEGATIVE
ANION GAP SERPL CALCULATED.3IONS-SCNC: 8 MMOL/L (ref 7–16)
APAP SERPL-MCNC: <5 UG/ML (ref 10–30)
AST SERPL-CCNC: 20 U/L (ref 0–39)
BARBITURATES UR QL SCN: NEGATIVE
BASOPHILS # BLD: 0.05 K/UL (ref 0–0.2)
BASOPHILS NFR BLD: 1 % (ref 0–2)
BENZODIAZ UR QL: NEGATIVE
BILIRUB SERPL-MCNC: 0.2 MG/DL (ref 0–1.2)
BUN SERPL-MCNC: 10 MG/DL (ref 6–20)
BUPRENORPHINE UR QL: NEGATIVE
CALCIUM SERPL-MCNC: 8.9 MG/DL (ref 8.6–10.2)
CANNABINOIDS UR QL SCN: POSITIVE
CHLORIDE SERPL-SCNC: 106 MMOL/L (ref 98–107)
CK SERPL-CCNC: 289 U/L (ref 20–200)
CO2 SERPL-SCNC: 29 MMOL/L (ref 22–29)
COCAINE UR QL SCN: POSITIVE
CREAT SERPL-MCNC: 0.9 MG/DL (ref 0.7–1.2)
EOSINOPHIL # BLD: 0.15 K/UL (ref 0.05–0.5)
EOSINOPHILS RELATIVE PERCENT: 2 % (ref 0–6)
ERYTHROCYTE [DISTWIDTH] IN BLOOD BY AUTOMATED COUNT: 13 % (ref 11.5–15)
ETHANOLAMINE SERPL-MCNC: <10 MG/DL
FENTANYL UR QL: NEGATIVE
GFR SERPL CREATININE-BSD FRML MDRD: >60 ML/MIN/1.73M2
GLUCOSE SERPL-MCNC: 104 MG/DL (ref 74–99)
HCT VFR BLD AUTO: 43.9 % (ref 37–54)
HGB BLD-MCNC: 14.5 G/DL (ref 12.5–16.5)
IMM GRANULOCYTES # BLD AUTO: 0.05 K/UL (ref 0–0.58)
IMM GRANULOCYTES NFR BLD: 1 % (ref 0–5)
LYMPHOCYTES NFR BLD: 1.32 K/UL (ref 1.5–4)
LYMPHOCYTES RELATIVE PERCENT: 17 % (ref 20–42)
MCH RBC QN AUTO: 30.1 PG (ref 26–35)
MCHC RBC AUTO-ENTMCNC: 33 G/DL (ref 32–34.5)
MCV RBC AUTO: 91.1 FL (ref 80–99.9)
METHADONE UR QL: NEGATIVE
MONOCYTES NFR BLD: 0.68 K/UL (ref 0.1–0.95)
MONOCYTES NFR BLD: 9 % (ref 2–12)
NEUTROPHILS NFR BLD: 72 % (ref 43–80)
NEUTS SEG NFR BLD: 5.7 K/UL (ref 1.8–7.3)
OPIATES UR QL SCN: NEGATIVE
OXYCODONE UR QL SCN: NEGATIVE
PCP UR QL SCN: NEGATIVE
PLATELET # BLD AUTO: 385 K/UL (ref 130–450)
PMV BLD AUTO: 8.9 FL (ref 7–12)
POTASSIUM SERPL-SCNC: 4.4 MMOL/L (ref 3.5–5)
PROT SERPL-MCNC: 6.3 G/DL (ref 6.4–8.3)
RBC # BLD AUTO: 4.82 M/UL (ref 3.8–5.8)
SALICYLATES SERPL-MCNC: <0.3 MG/DL (ref 0–30)
SODIUM SERPL-SCNC: 143 MMOL/L (ref 132–146)
TEST INFORMATION: ABNORMAL
TOXIC TRICYCLIC SC,BLOOD: NEGATIVE
WBC OTHER # BLD: 8 K/UL (ref 4.5–11.5)

## 2024-01-31 PROCEDURE — 85025 COMPLETE CBC W/AUTO DIFF WBC: CPT

## 2024-01-31 PROCEDURE — 36415 COLL VENOUS BLD VENIPUNCTURE: CPT

## 2024-01-31 PROCEDURE — 99285 EMERGENCY DEPT VISIT HI MDM: CPT

## 2024-01-31 PROCEDURE — 93005 ELECTROCARDIOGRAM TRACING: CPT | Performed by: EMERGENCY MEDICINE

## 2024-01-31 PROCEDURE — 80053 COMPREHEN METABOLIC PANEL: CPT

## 2024-01-31 PROCEDURE — 1240000000 HC EMOTIONAL WELLNESS R&B

## 2024-01-31 PROCEDURE — 80179 DRUG ASSAY SALICYLATE: CPT

## 2024-01-31 PROCEDURE — 80143 DRUG ASSAY ACETAMINOPHEN: CPT

## 2024-01-31 PROCEDURE — G0480 DRUG TEST DEF 1-7 CLASSES: HCPCS

## 2024-01-31 PROCEDURE — 82550 ASSAY OF CK (CPK): CPT

## 2024-01-31 PROCEDURE — 80307 DRUG TEST PRSMV CHEM ANLYZR: CPT

## 2024-01-31 PROCEDURE — 6370000000 HC RX 637 (ALT 250 FOR IP): Performed by: PSYCHIATRY & NEUROLOGY

## 2024-01-31 PROCEDURE — 6370000000 HC RX 637 (ALT 250 FOR IP): Performed by: EMERGENCY MEDICINE

## 2024-01-31 RX ORDER — BENZTROPINE MESYLATE 1 MG/ML
2 INJECTION INTRAMUSCULAR; INTRAVENOUS 2 TIMES DAILY PRN
Status: DISCONTINUED | OUTPATIENT
Start: 2024-01-31 | End: 2024-02-04 | Stop reason: HOSPADM

## 2024-01-31 RX ORDER — NICOTINE 21 MG/24HR
1 PATCH, TRANSDERMAL 24 HOURS TRANSDERMAL DAILY
Status: DISCONTINUED | OUTPATIENT
Start: 2024-01-31 | End: 2024-02-04 | Stop reason: HOSPADM

## 2024-01-31 RX ORDER — HYDROXYZINE PAMOATE 25 MG/1
50 CAPSULE ORAL 3 TIMES DAILY PRN
Status: DISCONTINUED | OUTPATIENT
Start: 2024-01-31 | End: 2024-02-04 | Stop reason: HOSPADM

## 2024-01-31 RX ORDER — TRAZODONE HYDROCHLORIDE 50 MG/1
50 TABLET ORAL NIGHTLY PRN
Status: DISCONTINUED | OUTPATIENT
Start: 2024-01-31 | End: 2024-02-04 | Stop reason: HOSPADM

## 2024-01-31 RX ORDER — HALOPERIDOL 5 MG/ML
5 INJECTION INTRAMUSCULAR EVERY 6 HOURS PRN
Status: DISCONTINUED | OUTPATIENT
Start: 2024-01-31 | End: 2024-02-04 | Stop reason: HOSPADM

## 2024-01-31 RX ORDER — LORAZEPAM 1 MG/1
2 TABLET ORAL ONCE
Status: COMPLETED | OUTPATIENT
Start: 2024-01-31 | End: 2024-01-31

## 2024-01-31 RX ORDER — HALOPERIDOL 5 MG/1
5 TABLET ORAL EVERY 6 HOURS PRN
Status: DISCONTINUED | OUTPATIENT
Start: 2024-01-31 | End: 2024-02-04 | Stop reason: HOSPADM

## 2024-01-31 RX ORDER — ACETAMINOPHEN 325 MG/1
650 TABLET ORAL EVERY 6 HOURS PRN
Status: DISCONTINUED | OUTPATIENT
Start: 2024-01-31 | End: 2024-02-04 | Stop reason: HOSPADM

## 2024-01-31 RX ORDER — MAGNESIUM HYDROXIDE/ALUMINUM HYDROXICE/SIMETHICONE 120; 1200; 1200 MG/30ML; MG/30ML; MG/30ML
30 SUSPENSION ORAL PRN
Status: DISCONTINUED | OUTPATIENT
Start: 2024-01-31 | End: 2024-02-04 | Stop reason: HOSPADM

## 2024-01-31 RX ADMIN — LORAZEPAM 2 MG: 1 TABLET ORAL at 18:46

## 2024-01-31 RX ADMIN — HYDROXYZINE PAMOATE 50 MG: 50 CAPSULE ORAL at 19:55

## 2024-01-31 ASSESSMENT — SLEEP AND FATIGUE QUESTIONNAIRES
DO YOU USE A SLEEP AID: NO
AVERAGE NUMBER OF SLEEP HOURS: 7
DO YOU HAVE DIFFICULTY SLEEPING: NO

## 2024-01-31 ASSESSMENT — PATIENT HEALTH QUESTIONNAIRE - PHQ9
SUM OF ALL RESPONSES TO PHQ QUESTIONS 1-9: 0
SUM OF ALL RESPONSES TO PHQ QUESTIONS 1-9: 0
2. FEELING DOWN, DEPRESSED OR HOPELESS: 0
1. LITTLE INTEREST OR PLEASURE IN DOING THINGS: 0
SUM OF ALL RESPONSES TO PHQ9 QUESTIONS 1 & 2: 0
SUM OF ALL RESPONSES TO PHQ QUESTIONS 1-9: 0
SUM OF ALL RESPONSES TO PHQ QUESTIONS 1-9: 0

## 2024-01-31 ASSESSMENT — LIFESTYLE VARIABLES
HOW MANY STANDARD DRINKS CONTAINING ALCOHOL DO YOU HAVE ON A TYPICAL DAY: PATIENT DOES NOT DRINK
HOW OFTEN DO YOU HAVE A DRINK CONTAINING ALCOHOL: NEVER

## 2024-01-31 NOTE — ED PROVIDER NOTES
HPI:  1/31/24, Time: 2:26 PM ELISE Rodriguez is a 28 y.o. male presenting to the ED for psychiatric evaluation.  Patient states that he was in the midst of a break-up with his girlfriend, so he made threats that he no longer wanted to be here as a \"ploy\" for her not to leave.  He states that he never had any intention to harm himself.  He has a history of bipolar disorder, and he states that he has issues with controlling his reactions.  He states he has a remote history of suicidal ideations though adamantly denies any current suicidal ideations.  He states he has been compliant with his treatment.  He states that he voluntarily called the police and voluntarily came to the hospital his family wanted him to get checked out.  He has no acute medical complaints.    --------------------------------------------- PAST HISTORY ---------------------------------------------  Past Medical History:  has a past medical history of ADHD, Anxiety, and Bipolar 1 disorder (HCC).    Past Surgical History:  has no past surgical history on file.    Social History:  reports that he has been smoking cigars. He has never used smokeless tobacco. He reports current alcohol use. He reports current drug use. Drug: Marijuana (Weed).    Family History: family history is not on file.     The patient’s home medications have been reviewed.    Allergies: Patient has no known allergies.    -------------------------------------------------- RESULTS -------------------------------------------------  All laboratory and radiology results have been personally reviewed by myself   LABS:  Results for orders placed or performed during the hospital encounter of 01/31/24   SERUM DRUG SCREEN   Result Value Ref Range    Acetaminophen Level <5 (L) 10.0 - 30.0 ug/mL    Ethanol <10 <10 mg/dL    Salicylate Lvl <0.3 0.0 - 30.0 mg/dL    Toxic Tricyclic Sc,Blood NEGATIVE NEGATIVE   URINE DRUG SCREEN   Result Value Ref Range    Amphetamine Screen, Ur

## 2024-01-31 NOTE — ED NOTES
Assigned 7514 to Jazz in admitting  
Call placed to Dr Samuel for presentation and unable to reach.  Message left to return call  
Dr Lizzie sumner with [facility]   
Nurse to Nurse called to [facility] Delac[facility]  
Pt belongings, 2 bags in [address]  
concerned that he was going to kill himself stemming from the messages he sent to them.     Later, as I walked dad out, he expressed that pt is pacifying the situation and not being totally truthful. Pt is positive for cocaine and dad said that he drinks quite often.    Dad said that pt does not follow through with anything that is scheduled to help him improve his situation.  Dad reports that his main concern, beside the suicidal comments, is that pt is using drugs.    Risk Factors:  Mental Health Diagnosis   Substance Use  Recent break up   Prior suicide attempt\  Possible Confusion/conflict about sexual orientation/identity  Recent conflict with family/friends  Off prescribed Psych meds  Financial stressors  Social Isolation    Protective Factors:  Strong family/friend support   Outpatient provider  Initiated this ER visit - said that he called the police  Help-seeking behavior  Fair insight  Safe and stable housing  Has access to essential needs  Good communication Skills  Stable employment  Has some income  Active in the community when working  No access to weapons     Suicidal Ideations:  [x] Reports: per dad   [] Past [] Present   [] Denies    Suicide Attempts:  [x] Reports: 4 years ago by od  [] Denies    C-SSRS Screening Completed by RN: Current Suicide Risk:  [x] No Risk [] Low [] Moderate [] High    Homicidal Ideations:  [] Reports:   [] Past [] Present   [x] Denies     Self Injurious/Self Mutilation Behaviors:  [] Reports:    [] Past [] Present   [x] Denies    Hallucinations/Delusions:  [] Reports:   [x] Denies     Substance Use/Alcohol Use/Addiction:  [x] Reports:   [] Denies   [x] SBIRT Screen Complete.     Current or Past Substance Abuse Treatment:  [] Yes, When and Where:  [x] No    Current or Past Mental Health Treatment:  [x] Yes, When and Where:  [facility]  [] No    Legal Issues:  [x]  Yes (Specify)  on probabtion  []  No    Access to Weapons:  []  Yes (Specify)  [x]  No    Trauma History:  []

## 2024-02-01 PROBLEM — F60.2 ANTISOCIAL PERSONALITY DISORDER (HCC): Status: ACTIVE | Noted: 2024-02-01

## 2024-02-01 PROBLEM — F19.10 POLYSUBSTANCE ABUSE (HCC): Status: ACTIVE | Noted: 2024-02-01

## 2024-02-01 PROBLEM — F31.9 BIPOLAR 1 DISORDER (HCC): Status: RESOLVED | Noted: 2024-01-31 | Resolved: 2024-02-01

## 2024-02-01 PROBLEM — F31.12 BIPOLAR 1 DISORDER, MANIC, MODERATE (HCC): Status: ACTIVE | Noted: 2024-02-01

## 2024-02-01 PROBLEM — F31.2 SEVERE MANIC BIPOLAR 1 DISORDER WITH PSYCHOTIC BEHAVIOR (HCC): Status: ACTIVE | Noted: 2024-02-01

## 2024-02-01 PROBLEM — F31.2 SEVERE MANIC BIPOLAR 1 DISORDER WITH PSYCHOTIC BEHAVIOR (HCC): Status: RESOLVED | Noted: 2024-02-01 | Resolved: 2024-02-01

## 2024-02-01 LAB
CHOLEST SERPL-MCNC: 125 MG/DL
EKG ATRIAL RATE: 71 BPM
EKG P AXIS: 74 DEGREES
EKG P-R INTERVAL: 172 MS
EKG Q-T INTERVAL: 386 MS
EKG QRS DURATION: 92 MS
EKG QTC CALCULATION (BAZETT): 419 MS
EKG R AXIS: 84 DEGREES
EKG T AXIS: 71 DEGREES
EKG VENTRICULAR RATE: 71 BPM
HBA1C MFR BLD: 5.6 % (ref 4–5.6)
HDLC SERPL-MCNC: 33 MG/DL
LDLC SERPL CALC-MCNC: 77 MG/DL
TRIGL SERPL-MCNC: 74 MG/DL
VLDLC SERPL CALC-MCNC: 15 MG/DL

## 2024-02-01 PROCEDURE — 6370000000 HC RX 637 (ALT 250 FOR IP): Performed by: PSYCHIATRY & NEUROLOGY

## 2024-02-01 PROCEDURE — 36415 COLL VENOUS BLD VENIPUNCTURE: CPT

## 2024-02-01 PROCEDURE — 90792 PSYCH DIAG EVAL W/MED SRVCS: CPT | Performed by: NURSE PRACTITIONER

## 2024-02-01 PROCEDURE — 80061 LIPID PANEL: CPT

## 2024-02-01 PROCEDURE — 83036 HEMOGLOBIN GLYCOSYLATED A1C: CPT

## 2024-02-01 PROCEDURE — 1240000000 HC EMOTIONAL WELLNESS R&B

## 2024-02-01 PROCEDURE — 6370000000 HC RX 637 (ALT 250 FOR IP): Performed by: NURSE PRACTITIONER

## 2024-02-01 PROCEDURE — 93010 ELECTROCARDIOGRAM REPORT: CPT | Performed by: INTERNAL MEDICINE

## 2024-02-01 RX ORDER — LEVOFLOXACIN 750 MG/1
750 TABLET, FILM COATED ORAL DAILY
Status: COMPLETED | OUTPATIENT
Start: 2024-02-01 | End: 2024-02-04

## 2024-02-01 RX ORDER — DIVALPROEX SODIUM 250 MG/1
250 TABLET, DELAYED RELEASE ORAL EVERY 12 HOURS SCHEDULED
Status: DISCONTINUED | OUTPATIENT
Start: 2024-02-01 | End: 2024-02-02

## 2024-02-01 RX ORDER — OLANZAPINE 5 MG/1
5 TABLET ORAL NIGHTLY
Status: DISCONTINUED | OUTPATIENT
Start: 2024-02-01 | End: 2024-02-02

## 2024-02-01 RX ORDER — LEVOFLOXACIN 750 MG/1
750 TABLET, FILM COATED ORAL DAILY
Status: ON HOLD | COMMUNITY
End: 2024-02-04 | Stop reason: HOSPADM

## 2024-02-01 RX ADMIN — DIVALPROEX SODIUM 250 MG: 250 TABLET, DELAYED RELEASE ORAL at 20:48

## 2024-02-01 RX ADMIN — TRAZODONE HYDROCHLORIDE 50 MG: 50 TABLET ORAL at 20:48

## 2024-02-01 RX ADMIN — LEVOFLOXACIN 750 MG: 750 TABLET, FILM COATED ORAL at 11:28

## 2024-02-01 RX ADMIN — DIVALPROEX SODIUM 250 MG: 250 TABLET, DELAYED RELEASE ORAL at 11:28

## 2024-02-01 RX ADMIN — HYDROXYZINE PAMOATE 50 MG: 50 CAPSULE ORAL at 15:42

## 2024-02-01 RX ADMIN — OLANZAPINE 5 MG: 5 TABLET, FILM COATED ORAL at 20:48

## 2024-02-01 ASSESSMENT — PATIENT HEALTH QUESTIONNAIRE - PHQ9
SUM OF ALL RESPONSES TO PHQ QUESTIONS 1-9: 2
1. LITTLE INTEREST OR PLEASURE IN DOING THINGS: 1
2. FEELING DOWN, DEPRESSED OR HOPELESS: 1
SUM OF ALL RESPONSES TO PHQ QUESTIONS 1-9: 2
SUM OF ALL RESPONSES TO PHQ9 QUESTIONS 1 & 2: 2
SUM OF ALL RESPONSES TO PHQ QUESTIONS 1-9: 2

## 2024-02-01 ASSESSMENT — SLEEP AND FATIGUE QUESTIONNAIRES
DO YOU USE A SLEEP AID: YES
DO YOU HAVE DIFFICULTY SLEEPING: YES
AVERAGE NUMBER OF SLEEP HOURS: 4
SLEEP PATTERN: DIFFICULTY FALLING ASLEEP;DISTURBED/INTERRUPTED SLEEP

## 2024-02-01 ASSESSMENT — PAIN SCALES - GENERAL: PAINLEVEL_OUTOF10: 0

## 2024-02-01 NOTE — PROGRESS NOTES
Pt denies SI, HI and AVH. Pt minimizes the situation and stated his family is blowing everything out of proportion. Pt was argumentative when he first arrived to the unit. Pt received Ativan 2mg PO at 1846. Pt arrived to unit approximately 1930. Pt demanding medication for his anxiety. Informed pt he just received ativan less than 1 hour prior pt stated \"You don't want to give me help. The nurse downstairs said you would help me. My dad said you would help me up here.\" Pt stated the ativan didn't \" help me much at all.\" Vistaril given. Pt satisfied thus far with the PRN medication. Pt presents flat. Pt is on probation for DV against his grandmother. \"She wanted them to drop it but they won't so I think it got lowered to another charge but I'm not sure. I have 6 months left of 2 yrs probation because of it.\" Pt also stated \"I've been on probation almost all of my 20s\" Pt stated a charge for adderall in \"[place]\" was his previous reason for probation. Pt is irritable. Medications need verified at [facility] in [place]. Pt wants to stay up all night and watch tv. \"They allowed us to watch netflix all night at the other place.\" Encourage rest in his room. Oriented to room and unit. Will continue to monitor.      [facility]  Admission Note     Admission Type:   Admission Type: Involuntary    Reason for admission:  Reason for Admission: \"I overreacted in part the bipolar made it extremely extreme. My family was worried.\"      Addictive Behavior:   Addictive Behavior  In the Past 3 Months, Have You Felt or Has Someone Told You That You Have a Problem With  : None    Medical Problems:   Past Medical History:   Diagnosis Date    ADHD     Anxiety     Bipolar 1 disorder (HCC)        Status EXAM:  Mental Status and Behavioral Exam  Normal: No  Level of Assistance: Independent/Self  Facial Expression: Worried  Affect: Blunt  Level of Consciousness: Alert  Frequency of Checks: 4 times per hour, close  Mood:Normal:

## 2024-02-01 NOTE — GROUP NOTE
Group Therapy Note    Date: 2/1/2024    Group Start Time: 1055  Group End Time: 1130  Group Topic: Cognitive Skills    [facility] AC[facility]    Shahrzad Ford MSW, LSW        Group Therapy Note    Attendees: 8       Patient's Goal:  Pt attended group therapy where we discussed cognitive distortions.    Notes:  Pt was an active participant in group.    Status After Intervention:  Improved    Participation Level: Active Listener and Interactive    Participation Quality: Appropriate and Attentive      Speech:  normal      Thought Process/Content: Logical      Affective Functioning: Congruent      Mood: euthymic      Level of consciousness:  Alert, Oriented x4, and Attentive      Response to Learning: Able to verbalize current knowledge/experience, Able to verbalize/acknowledge new learning, and Able to retain information      Endings: None Reported    Modes of Intervention: Education, Support, Socialization, Exploration, Clarifying, and Problem-solving      Discipline Responsible: /Counselor      Signature:  LISSET Duff, LIN

## 2024-02-01 NOTE — PROGRESS NOTES
4 Eyes Skin Assessment     NAME:  Dedrick Rodriguez  YOB: 1995  MEDICAL RECORD NUMBER:  [id number]    The patient is being assessed for  Admission    I agree that at least one RN has performed a thorough Head to Toe Skin Assessment on the patient. ALL assessment sites listed below have been assessed.      Areas assessed by both nurses:    Head, Face, Ears, Shoulders, Back, Chest, Arms, Elbows, Hands, Sacrum. Buttock, Coccyx, Ischium, and Legs. Feet and Heels        Does the Patient have a Wound? No noted wound(s)       Ector Prevention initiated by RN: No  Wound Care Orders initiated by RN: No    Pressure Injury (Stage 3,4, Unstageable, DTI, NWPT, and Complex wounds) if present, place Wound referral order by RN under : No    New Ostomies, if present place, Ostomy referral order under : No     Nurse 1 eSignature: Electronically signed by Mary Mello RN on 1/31/24 at 9:07 PM EST    **SHARE this note so that the co-signing nurse can place an eSignature**    Nurse 2 eSignature: {Esignature:[id number]}

## 2024-02-01 NOTE — DISCHARGE INSTRUCTIONS
Attending Provider: Leanna Samuel MD.     If you have any questions and need to contact this individual please call the unit at [phone].  A Behavioral Health Provider will be available on call 24/7 and during holidays.        Reason for Admission: Dedrick Rodriguez has a significant past history of bipolar 1 noncompliant with medication, ADHD, anxiety, and cocaine use presented to the ED after making suicidal threats to his girlfriend and family during a break-up with his girlfriend.

## 2024-02-01 NOTE — H&P
Department of Psychiatry  History and Physical - Adult           Patient personally seen and examined by me and mental status exam performed.  I agree the below assessment by the medical student.  Psychomotor evaluation revealed no agitation retardation any abnormal movements.  His eye contact is fair his speech is rapid and pressured.  His mood is \"I I am anxious.\"  Affect is mood incongruent bright almost elated.  His thought process is linear without flight of ideas loose associations.  Thought contents devoid of any auditory visual hallucinations delusions or any other perceptual abnormalities. He denies suicidal homicidal ideations intent or plan.  He is able to repeat words and phrases, his vocabulary is intact he has knowledge of current events and past events recent remote memory are intact   impulse control is adequate cognitive function peers to be his baseline his insight judgment is fair he is alert oriented time place and person      CHIEF COMPLAINT: \"I want to keep my reactions in check and set a routine\"    Patient was seen after discussing with the treatment team and reviewing the chart.    CIRCUMSTANCES OF ADMISSION: Dedrick Rodriguez has a significant past history of bipolar 1 noncompliant with medication, ADHD, anxiety, and cocaine use presented to the ED after making suicidal threats to his girlfriend and family during a break-up with his girlfriend.     HISTORY OF PRESENT ILLNESS:      The patient is a 28 y.o. male  currently employed living alone with a significant past history of bipolar 1 noncompliant with medication, ADHD, anxiety, and cocaine use with one prior psychiatric hospitalization 5-6 years ago presented to the ED after making suicidal threats to his girlfriend and family during a break-up with his girlfriend. Patient states he has never had suicidal ideations and has never made plans to harm himself. His family was worried about his health and the patient was worried about

## 2024-02-01 NOTE — PROGRESS NOTES
Spiritual Support Group Note    Number of Participants in Group:                        Time:     Goal: Relief from isolation and loneliness             Codie Sharing             Self-understanding and gain insight              Acceptance and belonging            Recognize they are not alone                Socialization             Empowerment       Encouragement    Topic:  [x] Spiritual Wellness and Self Care                  [] Hope                     [] Connecting with Divine/Others        [x] Thankfulness and Gratitude               []  Meaningfulness and Purpose               [] Forgiveness               [] Peace               [] Connect to Community      [] Other    Participation Level:   [x] Active Listener   [] Minimal   [] Monopolizing   [] Interactive   [] No Participation   []  Other:     Attention:   [x] Alert   [] Distractible   [] Drowsy   [] Poor   [] Other:    Manner:   [x] Cooperative   [] Suspicious   [] Withdrawn   [] Guarded   [] Irritable   [] Inhospitable   [] Other:     Others Comments from Group:

## 2024-02-01 NOTE — CARE COORDINATION
Biopsychosocial Assessment Note    Social work met with patient to complete the biopsychosocial assessment and C-SSRS.     Chief Complaint: pt reports \"I called myself.\"    Mental Status Exam: pt alert&oriented x4. Pt cooperative, withdrawn, guarded. Pt mood depressed, anxious, flat, blunt affect. Pt eye contact poor, speech clear. Pt thoughts linear, impaired, minimizing. Pt insight/judgement poor. Pt denied SI/HI/AVH.    Clinical Summary: pt reports he called to have himself brought to the hospital because his family was worried about him. When asked what his family was worried about he reported his health. Pt reports recent stressors of isolating, \"my mind,\" and not being in a routine. Per ER SW note, \"Pt explained that he and his girlfriend broke up earlier today. He said that he left the house to walk, feeling overwhelmed and called the police himself because he felt like he was \"struggling mentally\".\"     Pt reports one previous inpatient psychiatric admission 5-6 years ago. Pt reports he was admitted in [place], could not provide further details. Per chart, pt was admitted to [facility] for depression and OD. Pt is active with [facility] but he has been off of his medications for over a month. Pt denied any hx of suicidal thoughts, suicide attempts, or self-injurious behaviors. Pt denied trauma/abuse hx. Pt denied alcohol use. Pt reports using marijuana daily but stated he has an  medical marijuana card. Pt reports using cocaine every couple of days. Pt UDS positive for cocaine and cannabis. Per ER SW note, \"Pt is positive for cocaine and dad said that he drinks quite often.\" Pt reports previous inpatient substance abuse rehab at [facility] in [place]. Pt does not want to go to an inpatient substance abuse rehab at time of discharge but he would like to be referred to [facility] dual IOP. Pt would also like to talk with a  during this

## 2024-02-01 NOTE — CARE COORDINATION
Peer Recovery Support Note    Name: Dedrick Rodriguez  Date: 2/1/2024    Chief Complaint   Patient presents with    Psychiatric Evaluation     Had argument with family and he made some comments of concern and brought here        Peer Support met with patient.  [x] Support and education provided  [] Resources provided   [x] Treatment referral: [facility]  [x] Other: Left Peer contact information  [] Patient declined peer recovery services     Referred By: Urmila (HERIBERTO)    Notes: Met with patient who was very pleasant and upbeat. Patient admits that he has a problem with cocaine, and that he no longer wants to use drugs. Peer inquired about inpatient rehab, and patient explained that he had been in and out of treatment/psychiatric wards for years. He would instead like to do IOP, and appeared very excited to start this treatment and his recovery journey. Peer called  Coordinator (Ev) who confirmed that patient had already been scheduled by HERIBERTO to come in for his initial assessment on Wednesday, 2/14 at 1:30pm. Peer left contact information and instructions on how to get to his IOP classes, and wished patient well.    Signed: Carolyn Ramírez, 2/1/2024                [phone]

## 2024-02-01 NOTE — PROGRESS NOTES
Patient was observed resting with eyes closed during community meeting and did not respond to verbal invitation.  Patient will continue to be provided with opportunities to enhance leisure skills/interests and/or coping mechanisms.

## 2024-02-01 NOTE — GROUP NOTE
Group Therapy Note    Date: 2/1/2024  Start Time: 1000  End Time:  1045  Number of Participants: 9    Type of Group: Recovery    Wellness Binder Information  Module Name: Peer Recovery    Patient's Goal:  Patient will be able to demonstrate knowledge of community resources available for mental health and/or drug and alcohol.     Notes:  Peer , Deondre Brown spoke to patient regarding community resources available and provided patient with a story of hope and recovery.  Patient listened attentively in group, and was receptive to the information provided.     Status After Intervention:  Improved    Participation Level: Active Listener    Participation Quality: Appropriate and Attentive      Speech:  normal      Thought Process/Content: Logical      Affective Functioning: Congruent      Mood: anxious      Level of consciousness:  Alert and Attentive      Response to Learning: Able to verbalize current knowledge/experience and Able to verbalize/acknowledge new learning      Endings: None Reported    Modes of Intervention: Education, Support, Exploration, and Clarifying      Discipline Responsible: Recreational Therapist      Signature:  DENTON King

## 2024-02-02 PROCEDURE — 99232 SBSQ HOSP IP/OBS MODERATE 35: CPT | Performed by: NURSE PRACTITIONER

## 2024-02-02 PROCEDURE — 6370000000 HC RX 637 (ALT 250 FOR IP): Performed by: NURSE PRACTITIONER

## 2024-02-02 PROCEDURE — 1240000000 HC EMOTIONAL WELLNESS R&B

## 2024-02-02 PROCEDURE — 6370000000 HC RX 637 (ALT 250 FOR IP): Performed by: PSYCHIATRY & NEUROLOGY

## 2024-02-02 RX ORDER — DIVALPROEX SODIUM 500 MG/1
500 TABLET, DELAYED RELEASE ORAL EVERY 12 HOURS SCHEDULED
Status: DISCONTINUED | OUTPATIENT
Start: 2024-02-02 | End: 2024-02-04 | Stop reason: HOSPADM

## 2024-02-02 RX ADMIN — LEVOFLOXACIN 750 MG: 750 TABLET, FILM COATED ORAL at 09:01

## 2024-02-02 RX ADMIN — DIVALPROEX SODIUM 500 MG: 500 TABLET, DELAYED RELEASE ORAL at 21:26

## 2024-02-02 RX ADMIN — HYDROXYZINE PAMOATE 50 MG: 50 CAPSULE ORAL at 19:24

## 2024-02-02 RX ADMIN — TRAZODONE HYDROCHLORIDE 50 MG: 50 TABLET ORAL at 21:26

## 2024-02-02 RX ADMIN — OLANZAPINE 7.5 MG: 5 TABLET, FILM COATED ORAL at 21:26

## 2024-02-02 RX ADMIN — DIVALPROEX SODIUM 250 MG: 250 TABLET, DELAYED RELEASE ORAL at 09:01

## 2024-02-02 ASSESSMENT — PAIN SCALES - GENERAL: PAINLEVEL_OUTOF10: 0

## 2024-02-02 NOTE — GROUP NOTE
Group Therapy Note    Date: 2/2/2024    Group Start Time: 1530  Group End Time: 1610  Group Topic: Recreational    [facility] 2    Darlene Mabry CTRS    Group Therapy Note    Attendees: 12                                                                    Group Therapy Note    Date: 2/2/2024  Start Time: 1530  End Time:  1610  Number of Participants: 12    Type of Group: Recreational    Name:  Fact or Fiction Trivia    Patient's Goal:  Increased knowledge of mental health topics. Encouraged fun.    Notes:  CTRS provided patients with 1-2 step instructions for fact or fiction trivia and fact/fiction signs. Patients held up signs to answer questions from CTRS. CTRS facilitated group discussion on questions and encouraged patients to share. Patient followed instructions of game and was actively engaged in group discussion.    Status After Intervention:  Improved    Participation Level: Active Listener and Interactive    Participation Quality: Appropriate, Attentive, Sharing, and Supportive      Speech:  normal      Thought Process/Content: Logical  Linear      Affective Functioning: Congruent      Mood:  Pleasant      Level of consciousness:  Alert and Attentive      Response to Learning: Able to verbalize current knowledge/experience, Able to verbalize/acknowledge new learning, Able to retain information, Capable of insight, and Progressing to goal      Endings: None Reported    Modes of Intervention: Education, Socialization, Exploration, Clarifying, and Activity      Discipline Responsible: Psychoeducational Specialist      Signature:  DENTON Stoner

## 2024-02-02 NOTE — CARE COORDINATION
SW met with pt during treatment team. Pt sated that he is feeling good today, his medications are good and he is sleeping good. Pt stated that he feels clear and a lot better. Pt stated that when he is discharged he will be going back home alone and his girlfriend is a good support for him as they have been together for 6 months. Pt stated that he will be doing the IOP program and this will be good for him because he was seeing a counselor at [facility] once a month and he would like more support. 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 Pt denied any SI, AVH.     SW contacted [facility] [phone] to schedule follow up appointments. SW was informed that the pt was discharged from their services for non-compliance.     [facility]   [address], [address], [place] [address]   Phone: [phone]   Fax: [phone]

## 2024-02-02 NOTE — PROGRESS NOTES
Pt attended afternoon smoking cessation group. Pt appeared to be an active listener. Pt is able to share appropriately when prompted and asked facilitator relevant questions.  Pt was participant 1 of 8.    Electronically signed by Anuja Leach on 2/2/2024 at 3:35 PM

## 2024-02-02 NOTE — PROGRESS NOTES
BEHAVIORAL HEALTH FOLLOW-UP NOTE     2/2/2024     Patient was seen and examined in person, Chart reviewed   Patient's case discussed with staff/team      Patient personally seen and examined by me and mental status exam performed.  I agree the below assessment by the medical student.  Psychomotor evaluation revealed no agitation retardation any abnormal movements.  His eye contact is fair his speech is normal rate rhythm and tone.  His mood is \"I feel more level.\"  Affect is mood congruent appropriate pleasant his thought process is linear without flight of ideas loose associations.  Thought contents devoid of any auditory visual hallucinations delusions or any other perceptual abnormalities. He denies suicidal homicidal ideations intent or plan.  He is able to repeat words and phrases, his vocabulary is intact he has knowledge of current events and past events recent remote memory are intact   impulse control is adequate cognitive function peers to be his baseline his insight judgment is fair he is alert oriented time place and person              Chief Complaint: \"I am feeling much more leveled\"    Interim History:   Per  note, no PRN medications given last night. Patient was sleeping when entering room. Upon awaking, patient was pleasant and calm. When asked about how he was feeling today, he replied \"Better, I am feeling much more leveled\". Reported that he hasn't felt this good in a while. He stated he was participating in group activities and was taking his medications. He denies thoughts of suicide and self-harm. He denies any new hallucinations and delusions. His goal remains the same which is to stabilize his mood and keep his reactions in-check.     Appetite:    [x] Normal/Unchanged  [] Increased  [] Decreased      Sleep:       [x] Normal/Unchanged  [] Fair       [] Poor              Energy:    [x] Normal/Unchanged  [] Increased  [] Decreased        SI [] Present  [x] Absent    HI  []Present  [x] Absent

## 2024-02-02 NOTE — GROUP NOTE
Group Therapy Note    Date: 2/2/2024    Group Start Time: 1050  Group End Time: 1135  Group Topic: Psychotherapy    [facility]    Shahrzad Ford MSW, LSW        Group Therapy Note    Attendees: 9          Patient's Goal:  To increase social interaction and improve relationships with others.      Notes:  Pt was attentive in group and was able to identify an agenda. They were also able to verbalize relating to others within the group.    Status After Intervention:  Improved    Participation Level: Active Listener and Interactive    Participation Quality: Appropriate, Attentive, Sharing, and Supportive      Speech:  normal      Thought Process/Content: Logical      Affective Functioning: Congruent      Mood: euthymic      Level of consciousness:  Alert, Oriented x4, and Attentive      Response to Learning: Able to verbalize current knowledge/experience, Able to verbalize/acknowledge new learning, and Able to retain information      Endings: None Reported    Modes of Intervention: Education, Support, Socialization, Exploration, Clarifying, and Problem-solving      Discipline Responsible: /Counselor      Signature:  LISSET Duff LSW

## 2024-02-02 NOTE — GROUP NOTE
Group Therapy Note    Date: 2/2/2024    Group Start Time: 0935  Group End Time: 1005  Group Topic: Psychoeducation    [facility]    Darlene Mabry CTRS    Group Therapy Note                                                                      Group Therapy Note    Date: 2/2/2024  Start Time: 0935  End Time:  1005  Number of Participants: 11    Type of Group: Psychoeducation    Name:  Fostering Healthy Relationships    Patient's Goal:  ID characteristics of unhealthy relationships, how to improve relationships, ID characteristics of healthy relationships.    Notes:  CTRS lead educational discussion on fostering healthy relationships. Encouraged patients to share their experiences. Patient was actively engaged in group.    Status After Intervention:  Improved    Participation Level: Active Listener and Interactive    Participation Quality: Appropriate, Attentive, Sharing, and Supportive      Speech:  normal      Thought Process/Content: Logical  Linear      Affective Functioning: Congruent      Mood:  Appropriate      Level of consciousness:  Alert and Attentive      Response to Learning: Able to verbalize current knowledge/experience, Able to verbalize/acknowledge new learning, Able to retain information, Capable of insight, Able to change behavior, and Progressing to goal      Endings: None Reported    Modes of Intervention: Education, Support, Socialization, Exploration, Clarifying, and Problem-solving      Discipline Responsible: Psychoeducational Specialist      Signature:  DENTON Stoner

## 2024-02-02 NOTE — GROUP NOTE
Group Therapy Note    Date: 2/2/2024    Group Start Time: 0925  Group End Time: 0935  Group Topic: Community Meeting    [facility]    Darlene Mabry CTRS    Group Therapy Note    Attendees: 10                                                                    Group Therapy Note    Date: 2/2/2024  Start Time: 0925  End Time:  0935  Number of Participants: 10    Type of Group: Community Meeting    Name:  Community Meeting    Was updated on expectations of the unit, staffing, and programming.  Patient shared goal for today as \"Continue to follow process you guys have here, trust the process.\"    Status After Intervention:  Improved    Participation Level: Active Listener and Interactive    Participation Quality: Appropriate, Attentive, Sharing, and Supportive      Speech:  normal      Thought Process/Content: Logical  Linear      Affective Functioning: Congruent      Mood:  Appropriate      Level of consciousness:  Alert and Attentive      Response to Learning: Able to verbalize current knowledge/experience, Able to verbalize/acknowledge new learning, Able to retain information, Capable of insight, and Progressing to goal      Endings: None Reported    Modes of Intervention: Education, Support, Socialization, Exploration, Clarifying, and Problem-solving      Discipline Responsible: Psychoeducational Specialist      Signature:  DENTON Stoner

## 2024-02-03 PROCEDURE — 6370000000 HC RX 637 (ALT 250 FOR IP): Performed by: NURSE PRACTITIONER

## 2024-02-03 PROCEDURE — 1240000000 HC EMOTIONAL WELLNESS R&B

## 2024-02-03 PROCEDURE — 6370000000 HC RX 637 (ALT 250 FOR IP): Performed by: PSYCHIATRY & NEUROLOGY

## 2024-02-03 PROCEDURE — 99232 SBSQ HOSP IP/OBS MODERATE 35: CPT | Performed by: NURSE PRACTITIONER

## 2024-02-03 RX ADMIN — DIVALPROEX SODIUM 500 MG: 500 TABLET, DELAYED RELEASE ORAL at 08:53

## 2024-02-03 RX ADMIN — TRAZODONE HYDROCHLORIDE 50 MG: 50 TABLET ORAL at 21:05

## 2024-02-03 RX ADMIN — LEVOFLOXACIN 750 MG: 750 TABLET, FILM COATED ORAL at 08:53

## 2024-02-03 RX ADMIN — OLANZAPINE 7.5 MG: 5 TABLET, FILM COATED ORAL at 21:05

## 2024-02-03 RX ADMIN — DIVALPROEX SODIUM 500 MG: 500 TABLET, DELAYED RELEASE ORAL at 21:05

## 2024-02-03 ASSESSMENT — PAIN SCALES - GENERAL: PAINLEVEL_OUTOF10: 0

## 2024-02-03 NOTE — GROUP NOTE
Group Therapy Note    Date: 2/3/2024    Group Start Time: 1055  Group End Time: 1130  Group Topic: Cognitive Skills    [facility]    Urmila Hamilton MSW, JEMIMAW        Group Therapy Note    Attendees: 12       Patient's Goal: to participate in positivity bingo.     Notes: pt was an active participant in group activity.     Status After Intervention:  Improved    Participation Level: Active Listener and Interactive    Participation Quality: Appropriate, Attentive, and Sharing      Speech:  normal      Thought Process/Content: Logical      Affective Functioning: Congruent      Mood: anxious      Level of consciousness:  Alert and Oriented x4      Response to Learning: Able to verbalize current knowledge/experience      Endings: None Reported    Modes of Intervention: Education, Support, Socialization, Exploration, Clarifying, Problem-solving, and Activity      Discipline Responsible: /Counselor      Signature:  LISSET Barraza, LIN

## 2024-02-03 NOTE — BH NOTE
Pt denies suicidal / homicidal ideations.  Pt denies hallucinations.  Pt is cooperative and pleasant in affect, social with peers.  Negative immediate behavioral concerns.

## 2024-02-03 NOTE — PROGRESS NOTES
BEHAVIORAL HEALTH FOLLOW-UP NOTE     2/3/2024     Patient was seen and examined in person, Chart reviewed   Patient's case discussed with staff/team        Chief Complaint: \"I am feeling a lot better\"    Interim History:   I saw patient this morning in his room he tells me he is feeling \"a lot better.\"  He denies suicidal ideations intent or plan denies auditory or visual hallucinations states his medications are best he has been on.  He wants to do IOP on discharge.  No behavioral disturbances has been out visible in the milieu attending groups and socializing with peers no overt or covert signs psychosis no neurovegetative signs of depression      Appetite:    [x] Normal/Unchanged  [] Increased  [] Decreased      Sleep:       [x] Normal/Unchanged  [] Fair       [] Poor              Energy:    [x] Normal/Unchanged  [] Increased  [] Decreased        SI [] Present  [x] Absent    HI  []Present  [x] Absent     Aggression:  [] yes  [x] no    Patient is [x] able  [] unable to CONTRACT FOR SAFETY     PAST MEDICAL/PSYCHIATRIC HISTORY:   Past Medical History:   Diagnosis Date    ADHD     Anxiety     Bipolar 1 disorder (HCC)        FAMILY/SOCIAL HISTORY:  Family History   Problem Relation Age of Onset    No Known Problems Sister     No Known Problems Brother      Social History     Socioeconomic History    Marital status: Single     Spouse name: Not on file    Number of children: 0    Years of education: 12    Highest education level: Not on file   Occupational History    Not on file   Tobacco Use    Smoking status: Former     Types: Cigars    Smokeless tobacco: Never   Vaping Use    Vaping Use: Never used   Substance and Sexual Activity    Alcohol use: Not Currently    Drug use: Yes     Types: Marijuana (Weed), Cocaine     Comment: last used Saturday    Sexual activity: Yes     Partners: Female   Other Topics Concern    Not on file   Social History Narrative    Not on file     Social Determinants of Health     Financial

## 2024-02-03 NOTE — GROUP NOTE
Group Therapy Note    Date: 2/3/2024    Group Start Time: 0945  Group End Time: 1030  Group Topic: Psychoeducation    [facility]    Mallory Alvarez CTRS                                                                        Group Therapy Note    Date: 2/3/2024  Module Name:  supporting others and supporting me     Patient's Goal: Patient will be able to id ways to communicate with others, and  clarify what type of support is useful for him/her during a crisis.     Notes:  Polite willing to listen and share appropriately. Accepting of handout.     Status After Intervention:  Improved    Participation Level: Active Listener and Interactive    Participation Quality: Appropriate, Attentive, and Sharing      Speech:  normal      Thought Process/Content: Logical      Affective Functioning: Congruent      Mood: euthymic      Level of consciousness:  Alert, Oriented x4, and Attentive      Response to Learning: Able to verbalize/acknowledge new learning, Able to retain information, and Progressing to goal      Endings: None Reported    Modes of Intervention: Education, Support, Socialization, and Clarifying      Discipline Responsible: Psychoeducational Specialist      Signature:  DENTON Foy

## 2024-02-03 NOTE — GROUP NOTE
Shared goal for the day as to attend group.                                                                       Group Therapy Note    Date: 2/3/2024    Group Start Time: 0915  Group End Time: 0935  Group Topic: Community Meeting    [facility]    Mallory Alvarez CTRS                                                                        Group Therapy Note    Date: 2/3/2024  Type of Group: Community Meeting    Notes:  Patient updated on staffing, daily expectations, and programming offered today.       Participation Level: Active Listener and Interactive    Participation Quality: Appropriate and Attentive      Speech:  normal      Thought Process/Content: Logical      Affective Functioning: Congruent      Mood: euthymic      Level of consciousness:  Alert, Oriented x4, and Attentive      Response to Learning: Able to verbalize current knowledge/experience      Endings: None Reported    Modes of Intervention: Support, Socialization, and Clarifying      Discipline Responsible: Psychoeducational Specialist      Signature:  DENTON Foy

## 2024-02-03 NOTE — BH NOTE
Pt is stable and without immediate distress at this time.   Pt denies suicidal / homicidal ideations.   Pt  is cooperative and without behavioral concerns at this time.

## 2024-02-03 NOTE — GROUP NOTE
Group Therapy Note    Date: 2/3/2024    Group Start Time: 1600  Group End Time: 1645  Group Topic: Recreational    [facility]    Mallory Alvarez CTRS                                                                        Group Therapy Note        Type of Group: Recreational    Wellness Binder Information  Module Name:  FAVORITES     Patient's Goal: Patient will be able to participate in group conversation about favorite movies, podcasts, books, and series.     Notes:  Pleasant and engaged in group conversation.     Status After Intervention:  Improved    Participation Level: Active Listener and Interactive    Participation Quality: Appropriate, Attentive, and Sharing      Speech:  normal      Thought Process/Content: Logical      Affective Functioning: Congruent      Mood: euthymic      Level of consciousness:  Alert, Oriented x4, and Attentive      Response to Learning: Able to verbalize current knowledge/experience and Progressing to goal      Endings: None Reported    Modes of Intervention: Support, Socialization, and Exploration      Discipline Responsible: Psychoeducational Specialist      Signature:  DENTON Foy             Signature:  DENTON Foy

## 2024-02-03 NOTE — PROGRESS NOTES
Pt resting comfortably in bed. No signs of distress. Respirations greater than 12 per minute. Safety rounds continued. Will continue to monitor.

## 2024-02-04 VITALS
BODY MASS INDEX: 19.33 KG/M2 | HEIGHT: 70 IN | HEART RATE: 66 BPM | DIASTOLIC BLOOD PRESSURE: 56 MMHG | RESPIRATION RATE: 14 BRPM | WEIGHT: 135.06 LBS | TEMPERATURE: 98.2 F | SYSTOLIC BLOOD PRESSURE: 106 MMHG | OXYGEN SATURATION: 98 %

## 2024-02-04 LAB
DATE LAST DOSE: NORMAL
TME LAST DOSE: NORMAL H
VALPROATE SERPL-MCNC: 64 UG/ML (ref 50–100)
VANCOMYCIN DOSE: NORMAL MG

## 2024-02-04 PROCEDURE — 36415 COLL VENOUS BLD VENIPUNCTURE: CPT

## 2024-02-04 PROCEDURE — 6370000000 HC RX 637 (ALT 250 FOR IP): Performed by: NURSE PRACTITIONER

## 2024-02-04 PROCEDURE — 99239 HOSP IP/OBS DSCHRG MGMT >30: CPT | Performed by: NURSE PRACTITIONER

## 2024-02-04 PROCEDURE — 80164 ASSAY DIPROPYLACETIC ACD TOT: CPT

## 2024-02-04 RX ORDER — NICOTINE 21 MG/24HR
1 PATCH, TRANSDERMAL 24 HOURS TRANSDERMAL DAILY
Qty: 30 PATCH | Refills: 0 | Status: SHIPPED | OUTPATIENT
Start: 2024-02-04 | End: 2024-03-05

## 2024-02-04 RX ORDER — OLANZAPINE 7.5 MG/1
7.5 TABLET, FILM COATED ORAL NIGHTLY
Qty: 30 TABLET | Refills: 0 | Status: SHIPPED | OUTPATIENT
Start: 2024-02-04 | End: 2024-03-05

## 2024-02-04 RX ORDER — DIVALPROEX SODIUM 500 MG/1
500 TABLET, DELAYED RELEASE ORAL EVERY 12 HOURS SCHEDULED
Qty: 60 TABLET | Refills: 0 | Status: SHIPPED | OUTPATIENT
Start: 2024-02-04 | End: 2024-03-05

## 2024-02-04 RX ADMIN — DIVALPROEX SODIUM 500 MG: 500 TABLET, DELAYED RELEASE ORAL at 08:33

## 2024-02-04 RX ADMIN — LEVOFLOXACIN 750 MG: 750 TABLET, FILM COATED ORAL at 08:33

## 2024-02-04 ASSESSMENT — PAIN SCALES - GENERAL: PAINLEVEL_OUTOF10: 0
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
## 2024-02-04 NOTE — CARE COORDINATION
SW met with pt to discuss discharge for today. Pt is alert and oriented x4. Pt's mood is excited, affect is appropriate and congruent. Pt's speech is clear, rate and volume is normal. Pt's insight and judgement is good. Pt denies depression and anxiety. Pt denies SI, HI, AVH. Pt reported that he will be discharging to his apartment and his father will be picking him up from the hospital. Pt stated that he is looking forward to seeing his girlfriend and giving her a \"big ol hug.\" Pt reported to feeling good today.       Pt has follow up appointments scheduled at [facility] for his mental health and substance abuse.    In order to ensure appropriate transition and discharge planning is in place, the following documents have been transmitted to [facility], as the new outpatient provider:    The d/c diagnosis was transmitted to the next care provider  The reason for hospitalization was transmitted to the next care provider  The d/c medications (dosage and indication) were transmitted to the next care provider   The continuing care plan was transmitted to the next care provider

## 2024-02-04 NOTE — BH NOTE
Behavioral Health Transition Record to Provider    Patient Name: Dedrick Rodriguez  YOB: 1995   Medical Record Number: [id number]  Date of Admission: 1/31/2024  1:45 PM   Date of Discharge:  2/4/24      Attending Provider: Leanna Samuel MD   Discharging Provider: Krista Samuel  To contact this individual call hospital and ask the  to page.  If unavailable, ask to be transferred to Behavioral Health Provider on call.  A Behavioral Health Provider will be available on call 24/7 and during holidays.    Primary Care Provider: No primary care provider on file.    No Known Allergies    Reason for Admission: Sent text messages to family stating he is suicidal.    Admission Diagnosis: Mood disorder ([facility]) [F39]  Bipolar 1 disorder (HCC) [F31.9]    * No surgery found *    Results for orders placed or performed during the hospital encounter of 01/31/24   SERUM DRUG SCREEN   Result Value Ref Range    Acetaminophen Level <5 (L) 10.0 - 30.0 ug/mL    Ethanol <10 <10 mg/dL    Salicylate Lvl <0.3 0.0 - 30.0 mg/dL    Toxic Tricyclic Sc,Blood NEGATIVE NEGATIVE   URINE DRUG SCREEN   Result Value Ref Range    Amphetamine Screen, Ur NEGATIVE NEGATIVE    Barbiturate Screen, Ur NEGATIVE NEGATIVE    Benzodiazepine Screen, Urine NEGATIVE NEGATIVE    Cocaine Metabolite, Urine POSITIVE (A) NEGATIVE    Methadone Screen, Urine NEGATIVE NEGATIVE    Opiates, Urine NEGATIVE NEGATIVE    Phencyclidine, Urine NEGATIVE NEGATIVE    Cannabinoid Scrn, Ur POSITIVE (A) NEGATIVE    Oxycodone Screen, Ur NEGATIVE NEGATIVE    Fentanyl, Ur NEGATIVE NEGATIVE    Buprenorphine Urine NEGATIVE NEGATIVE    Test Information       These drug screen results are for medical purposes only and should not be considered definitive or confirmed.   CBC with Auto Differential   Result Value Ref Range    WBC 8.0 4.5 - 11.5 k/uL    RBC 4.82 3.80 - 5.80 m/uL    Hemoglobin 14.5 12.5 - 16.5 g/dL    Hematocrit 43.9 37.0 - 54.0 %    MCV 91.1 80.0 - 99.9 fL

## 2024-02-04 NOTE — GROUP NOTE
Group Therapy Note    Date: 2/4/2024    Group Start Time: 1050  Group End Time: 1130  Group Topic: Psychotherapy    [facility] 1    Shahrzad Ford, LISSET, LIN        Group Therapy Note    Attendees: 11       Patient's Goal:  To increase social interaction and improve relationships with others.      Notes:  Pt was attentive in group and was able to identify an agenda. They were also able to verbalize relating to others within the group.    Status After Intervention:  Improved    Participation Level: Active Listener and Interactive    Participation Quality: Appropriate, Attentive, Sharing, and Supportive      Speech:  normal      Thought Process/Content: Logical      Affective Functioning: Congruent      Mood: euthymic      Level of consciousness:  Alert, Oriented x4, and Attentive      Response to Learning: Able to verbalize current knowledge/experience, Able to verbalize/acknowledge new learning, Able to retain information, and Capable of insight      Endings: None Reported    Modes of Intervention: Education, Support, Socialization, Exploration, Clarifying, and Problem-solving      Discipline Responsible: /Counselor      Signature:  LISSET Duff, LIN

## 2024-02-04 NOTE — GROUP NOTE
Group Therapy Note    Date: 2/4/2024    Group Start Time: 0945  Group End Time: 1030  Group Topic: Psychoeducation    [facility] 1    Mallory Alvarez, DENTON                                                                        Group Therapy Note    Module Name:  The sherman rule     Patient's Goal:  Patients will be able to id what the sherman rule is and why it is important to give yourself the same compassion as others.     Notes:  pleasant and appeared to be an active listener. Willing to contribute when prompted. Accepting of handout.     Status After Intervention:  Improved    Participation Level: Active Listener and Interactive    Participation Quality: Appropriate, Attentive, and Sharing      Speech:  normal      Thought Process/Content: Logical      Affective Functioning: Congruent      Mood: euthymic      Level of consciousness:  Alert, Oriented x4, and Attentive      Response to Learning: Able to verbalize/acknowledge new learning, Able to retain information, and Progressing to goal      Endings: None Reported    Modes of Intervention: Education, Support, Socialization, Clarifying, and Problem-solving      Discipline Responsible: Psychoeducational Specialist      Signature:  DENTON Foy

## 2024-02-04 NOTE — BH NOTE
[facility]  Discharge Note    Pt discharged with followings belongings:   Dental Appliances: None  Vision - Corrective Lenses: None  Hearing Aid: None  Jewelry: None  Body Piercings Removed: N/A  Clothing: Footwear, Jacket/Coat, Shorts, Socks, Pants  Other Valuables: Other (Comment) (belongings yet to be inventoried)   Valuables sent home with pt. or returned to patient. Patient educated on aftercare instructions: yes.  Patient verbalize understanding of AVS:  yes.    Status EXAM upon discharge:  Mental Status and Behavioral Exam  Normal: No  Level of Assistance: Independent/Self  Facial Expression: Brightened  Affect: Appropriate  Level of Consciousness: Alert  Frequency of Checks: 4 times per hour, close  Mood:Normal: No  Mood: Anxious  Motor Activity:Normal: Yes  Motor Activity: Decreased  Eye Contact: Good  Observed Behavior: Cooperative  Sexual Misconduct History: Current - no  Preception: Fine to person, Fine to time, Fine to place, Fine to situation  Attention:Normal: Yes  Attention: Others (comment) (Organized)  Thought Processes: Other (comment) (Organized)  Thought Content:Normal: Yes  Thought Content: Poverty of content  Depression Symptoms: No problems reported or observed.  Anxiety Symptoms: No problems reported or observed.  Sarah Symptoms: No problems reported or observed.  Hallucinations: None  Delusions: No  Memory:Normal: Yes  Memory: Other (comment) (intact)  Insight and Judgment: Yes  Insight and Judgment: Poor insight    Tobacco Screening:  Practical Counseling, on admission, eduar X, if applicable and completed (first 3 are required if patient doesn't refuse):            ( ) Recognizing danger situations (included triggers and roadblocks)                    ( ) Coping skills (new ways to manage stress,relaxation techniques, changing routine, distraction)                                                           ( x) Basic information about quitting (benefits of quitting,  Yes

## 2024-02-04 NOTE — CARE COORDINATION
Shanice called Dedrick - the pt's father. [phone]   He stated that he thinks Dedrick is doing much better.  He is concerned that the pt will not stay on his meds at discharge.  He also said he knows the pt has no guns but was going to his apartment to make sure there are no weapons.  Dedrick said that he is happy he is connected with [facility] for mental health and substance abuse.  Dad also wanted to make sure the pt has meds in hand.

## 2024-02-04 NOTE — GROUP NOTE
Shared goal for the day as to come to group and participate.                                                                       Group Therapy Note    Date: 2/4/2024    Group Start Time: 0920  Group End Time: 0945  Group Topic: Community Meeting    [facility]  1    Mallory Alvarez, MILTONS    Patient attended community meeting.  Updated on staffing assignments and daily expectations from staff and patients.  Explained processes for showering, meals and clothing.   Patient was able to be an active listener and share goal for the day.

## 2024-02-04 NOTE — DISCHARGE SUMMARY
input(s): \"NA\", \"K\", \"CL\", \"CO2\", \"BUN\", \"CREATININE\", \"GLUCOSE\" in the last 72 hours.  No results for input(s): \"BILITOT\", \"ALKPHOS\", \"AST\", \"ALT\" in the last 72 hours.  Lab Results   Component Value Date/Time    LABAMPH POSITIVE 11/14/2018 10:34 AM    BARBSCNU NEGATIVE 01/31/2024 01:59 PM    LABBENZ NEGATIVE 01/31/2024 01:59 PM    LABMETH NEGATIVE 01/31/2024 01:59 PM    OPIATESCREENURINE NOT DETECTED 11/14/2018 10:34 AM    PHENCYCLIDINESCREENURINE NOT DETECTED 11/14/2018 10:34 AM    ETOH <10 11/14/2018 09:56 AM     Lab Results   Component Value Date/Time    TSH 2.220 11/14/2018 09:31 AM     No results found for: \"LITHIUM\"  No results found for: \"VALPROATE\", \"CBMZ\"    RISK ASSESSMENT AT DISCHARGE: Low risk for suicide and homicide.     Treatment Plan:  Reviewed current Medications with the patient. Education provided on the complaince with treatment.    Risks, benefits, side effects, drug-to-drug interactions and alternatives to treatment were discussed.    Encourage patient to attend outpatient follow up appointment and therapy.    Patient was advised to call the outpatient provider, visit the nearest ED or call 911 if symptoms are not manageable.     Patient's family member was contacted prior to the discharge.         Medication List        START taking these medications      divalproex 500 MG DR tablet  Commonly known as: DEPAKOTE  Take 1 tablet by mouth every 12 hours     nicotine 21 MG/24HR  Commonly known as: NICODERM CQ  Place 1 patch onto the skin daily     OLANZapine 7.5 MG tablet  Commonly known as: ZYPREXA  Take 1 tablet by mouth nightly            STOP taking these medications      amphetamine-dextroamphetamine 30 MG tablet  Commonly known as: ADDERALL     Levaquin 750 MG tablet  Generic drug: levoFLOXacin     mupirocin 2 % ointment  Commonly known as: BACTROBAN     sodium chloride 0.65 % nasal spray  Commonly known as: Ocean Nasal Spray               Where to Get Your Medications        These

## 2024-02-14 ENCOUNTER — HOSPITAL ENCOUNTER (OUTPATIENT)
Dept: PSYCHIATRY | Age: 29
Setting detail: THERAPIES SERIES
Discharge: HOME | End: 2024-02-14
Payer: COMMERCIAL

## 2024-02-14 PROCEDURE — 80305 DRUG TEST PRSMV DIR OPT OBS: CPT

## 2024-02-14 PROCEDURE — 90791 PSYCH DIAGNOSTIC EVALUATION: CPT

## 2024-02-14 NOTE — PLAN OF CARE
available in 24-hour structured setting    [] If there is high severity in this dimension, but not in any other dimension, motivational enhancement strategies should be provided in Level 1 [] Problems in this dimension do not qualify the client for Level 4 services    [] If the client's only severity is in Dimension 4,5, and/or 6 without high severity in Dimensions 1,2, and/or 3, then client is not qualified for Level 4   COMMENTS:           Dimension 5  Relapse, Continued Use or Continued Problem Potential  [] Able to maintain abstinence or control use and/or addictive behaviors  and pursue recovery or motivational goals with minimal support [x] Intensification of addiction or mental health symptoms indicate high likelihood of relapse risk or continued use or continued problems without  close monitoring and support several times/wk. [] Intensification of addiction or mental health symptoms, despite active participation in a Level 1 or 2.1 program, indicates high likelihood of relapse or continued use or continued problems without near-daily monitoring [] Understands relapse but needs structure to maintain therapeutic gains  [] Has little awareness and needs interventions available only at Level 3.3 to prevent continued use, with imminent dangerous consequences, because of cognitive deficits or  comparable dysfunction  [] Has no recognition  of the skills needed to prevent continued use,  with imminently dangerous  consequences [] Unable to control use, with imminently dangerous consequences,  despite active participation at less intensive levels of care [] Problems in this dimension do not qualify the client for Level 4 services    [] If the client's only severity is in Dimension 4,5, and/or 6 without high severity in Dimensions 1,2, and/or 3, then client is not qualified for Level 4   COMMENTS:           Dimension 6  Recovery/Living Environment []  Recovery environment is supportive and/or the client has skills to

## 2024-02-14 NOTE — CARE COORDINATION
DIAGNOSTIC IMPRESSIONS: Substance-Use Disorder (SUB)    Scale: DSM-V Diagnosis Code   No diagnosis                    0-1    Mild RAYMOND                          2-3    Moderate RAYMOND                 4-5    Severe RAYMOND                      6+ F14.20, F12.20, F13.20 in full sustained remission.   Other factors: Patient has been smoking MJ from the age of 17. He has medical MJ card, but it recently . He smokes daily. He uses cocaine and his most recent use was this week. He uses about five times per week, a $10 dollar bag. He began cocaine use at 24 yo.  He drinks two glasses of wine at dinner once every two weeks with his girlfriend. His last use was one month ago. He last used benzodiazepine three years ago he would use it often and would mix it with alcohol a bottle of hard liquor per night. He is recommended for the Dual program and he is willing to participate in the program beginning 24.            Criteria symptoms   A problematic pattern of substance use leading to clinically significant impairment or distress, as manifested by at least two of the following, occurring within a 12-month period.    [x] Taken in larger amounts or over longer time than intended.    [x] Persistent desire or unsuccessful efforts to cut down/control use.    [x] Great deal of time spent obtaining , using, and recovering from effects.    [x] Craving or strong desire to use.    [x] Recurrent use resulting in failure to fulfill major role obligations at work; school, or home.    [x] Continued use despite persistent or recurrent social/interpersonal problems caused or exacerbated by effects.    [x] Giving up important social, occupational or recreational activities because of use.    [x] Recurrent use in situations in which it is physically hazardous.    [x] Continued use despite knowledge of persistent or recurrent physical or psychological problem likely caused/exacerbated by use.      [x] Tolerance as defined by either:  Need for 
Denies    Homicidal Ideation  [] Reports   [x] Denies      Hallucinations/Delusions   [] Reports   [x] Denies     Substance Use/Alcohol Use/Addiction  [x] Reports    [] Denies     Trauma History  [] Reports    [x] Denies     Plan of Care: Dual IOP    Patient Goal: To have stable mental health and a sober lifestyle.    Patient PHQ 9 Score: 9  Interpretation of Total Score Depression Severity: 1-4 = Minimal depression, 5-9 = Mild depression, 10-14 = Moderate depression, 15-19 = Moderately severe depression, 20-27 = Severe depression    Preliminary Diagnosis and Criteria: F12.20 cannabis use disorder, F14.20 cocaine use disorder, F13.20 in full sustained remission. F41.9 anxiety disorder, and F31.13 BiPolar disorder manic W/O psychotic features.      If session conducted via telehealth:   This session was conducted via: [] Video [] Telephone  Patient Location:  [] Treatment Center [] Home [] Other  Provider Location: [] Treatment Center [] Home [] Other    Signed: LISSET Barger, LSW               2/14/2024

## 2024-02-19 ENCOUNTER — HOSPITAL ENCOUNTER (OUTPATIENT)
Dept: PSYCHIATRY | Age: 29
Setting detail: THERAPIES SERIES
Discharge: HOME | End: 2024-02-19
Payer: COMMERCIAL

## 2024-02-19 PROCEDURE — H0015 ALCOHOL AND/OR DRUG SERVICES: HCPCS

## 2024-02-19 PROCEDURE — 90792 PSYCH DIAG EVAL W/MED SRVCS: CPT | Performed by: PSYCHIATRY & NEUROLOGY

## 2024-02-19 NOTE — GROUP NOTE
Group Therapy Note    Date: 2/19/2024    Group Start Time: 10:45 AM  Group End Time: 12:00 PM  Group Topic: Psychoeducation    [facility]    Vamshi Reid MSW, JEMIMAW    Topic: What is Trauma  Mode of intervention: socialization, imparting of information, group cohesion, interpersonal learning and catharsis.      Note: The definition of what is trauma was reviewed, trauma risk factors, symptoms of trama and treatment. Dedrick noted symptoms of trauma which include anger and irritability. The group reviewed poly-vagal techniques for introspective therapy and additional trauma treatments.         Status after intervention:Improved  Participation level: Active Listener and Interactive  Participation Quality: Appropriate, Attentive, and Sharing  Speech: normal  Thought Process/Content:Logical  Mood/Affect: calm and congruent  Self report: None Reported  Response to learning: Able to verbalize current knowledge/experience, Able to verbalize/acknowledge new learning, Able to retain information, Capable of insight, and Able to change behavior  Discipline Responsible: /Counselor    [x] Check in completed and reviewed.    Intervention required. no       Signature:  LISSET Barger, LIN

## 2024-02-19 NOTE — GROUP NOTE
Group Therapy Note    Date: 2/19/2024    Group Start Time:  9:00 AM  Group End Time: 10:30 AM  Group Topic: Psychotherapy    [facility]    Vamshi Reid MSW, LIN    Topic: Daily check in ,weekend report and how the brain responds to drugs.  Mode of intervention: self education, socialization, imparting of information, altruism, instillation of hope and group cohesion.     Note: This was Brittny first session. Group rules and HIPAA were reviewed. Dedrick noted his DOC is MELISSA. He last use was last weekend. He notes his girlfriend and father are supports for him. He is working on two issues which has troubled him, anger and impulsivity. The second part group focused on how drugs attach to neurons in the brain changing the function of the brain. The group reviewed sober meetings and Dedrick was given a documentation sheet.    Status after intervention:Improved  Participation level: Active Listener and Interactive  Participation Quality: Appropriate, Attentive, and Sharing  Speech: normal  Thought Process/Content:Logical  Mood/Affect: calm and congruent  Self report: None Reported  Response to learning: Able to verbalize current knowledge/experience, Able to verbalize/acknowledge new learning, Able to retain information, Capable of insight, and Able to change behavior  Discipline Responsible: /Counselor    [x] Check in completed and reviewed.    Intervention required. no     Signature:  LISSET Barger, LIN

## 2024-02-19 NOTE — H&P
known allergies.    FAMILY PSYCHIATRIC HISTORY: Reports maternal grandmother has bipolar disorder but is not medicated.     SOCIAL HISTORY: Patient born and raised locally. He grew up in [place] but mother moved to [place] so he could play soccer there. Patient reports parents were off and on together. He has two older sisters and a twin brother. Attended some college courses at [employer] but no degrees. Patient has never been  and does not have children. He has been with current girlfriend for several months and they are living together at his apartment. Patient supports himself by doing [employer]. He is on probation stemming from marijuana possession charge.     SUBSTANCE ABUSE HISTORY: Patient had spent six weeks at [facility].     MENTAL STATUS EXAM: White male appears age. Pleasant, cooperative, forthcoming. Normal psychomotor activity, gait, strength, tone, eye contact. Mood euthymic. Affect bright, smiles inappropriately at times. Speech clear, not pressured or rapid. Thought process organized without flight of ideas or loosening of associations. Content is future-oriented. No suicidal or homicidal ideations. No paranoia, delusions or hallucinations. Orientation, concentration, recent and remote memory are grossly intact. Fund of knowledge fair. Language use fair. Insight and judgment fair.     MEDICATIONS:  Depakote  mg bid (cont)     Zyprexa 7.5 mg at bed (cont)     ASSESSMENT:  Bipolar Disorder Unspecified     Cocaine Use Disorder     Cannabis Use Disorder     Rule-out substance-induced mood disorder     PLAN: Admit to IOP. Continue same psychotropics for now. Continue to monitor symptom, side effects and response to medications. Adjust treatment as needed. See back two weeks. Discuss with team.       Signed:  Electronically signed by Henrique Reid MD on 2/19/2024 at 11:31 AM

## 2024-02-22 ENCOUNTER — HOSPITAL ENCOUNTER (OUTPATIENT)
Dept: PSYCHIATRY | Age: 29
Setting detail: THERAPIES SERIES
Discharge: HOME | End: 2024-02-22
Payer: COMMERCIAL

## 2024-02-22 PROCEDURE — H2012 BEHAV HLTH DAY TREAT, PER HR: HCPCS

## 2024-02-22 NOTE — GROUP NOTE
Interactive    Participation Quality: Appropriate, Attentive, Sharing, and Supportive      Speech:  normal      Thought Process/Content: Logical      Affective Functioning: Blunted      Mood: anxious and depressed      Level of consciousness:  Alert, Oriented x4, and Attentive      Response to Learning: Able to verbalize current knowledge/experience, Able to verbalize/acknowledge new learning, Able to change behavior, and Progressing to goal      Endings: None Reported    Modes of Intervention: Education, Support, Socialization, Exploration, Problem-solving, and Activity      Discipline Responsible: /Counselor      Signature:  BRYCE Morgan

## 2024-02-22 NOTE — PROGRESS NOTES
Pt did not stay for second group of day. He did give urine sample on break. He will only be charged for 1 group and drug screen.

## 2024-02-23 NOTE — PLAN OF CARE
acquire 2-4 names and numbers of contacts during the AA/NA meetings to build up a support network.  # 3 client to share insights gained from the AA/NA meetings and share the information with the IOP group, including how the AA/ NA meetings can assist in recovery.      Goal # 2.Client will achieve uninterrupted sobriety throughout the 18-24 sessions of IOP treatment.    INTERVENTION: Therapist will assist the client to develop a plan to maintain abstinence by exploring the disease concept of addiction, past attempts at sobriety and by providing random drug screens to monitor sobriety.    Objective   # 1 To submit to weekly random urine screens during the 18-24 sessions with weekly reviews and signature.  # 2 Learn about RAYMOND and share 2-4 insights in the IOP group setting.  # 3 List and explore 2-4- ways to develop a plan to cope with triggers, urges and high risk situations to avoid relapse during the group process.    Goal # 3 Client will develop skills to understand continuing to associate with current peers increases the risk for relapse. Client should develop new peers who do not are substance free and are supportive of working a program of recovery during the 70-10-xxupogne.    INTERVENTION: Therapist will assist client through exploring in group therapy how association with peers who use substances or are not supportive of recovery can lead to relapse and or criminal activity. In addition, therapist will provide education on negative peer associations to aid in eliminating negative peer relationships.    Objective  # 1 Explore and verbalize in group therapy 2-4 instances when associating with negative peer influences has lead to criminal activity or substance use.  # 2 List and explore 2-4 reasons why peer group needs to change to maintain abstinence from substance use.  # 3 Verbalize 2-4 reasons the importance of  obeying the law is essential toward recovery.    Goal # 4 Client will develop a relapse 
Sticker      Level of Care Level 1  Outpatient   Services Level 2.1  Intensive  Outpatient  Services Level 2.5  Partial Hospitalization Services Level 3.1  CLINICALLY Managed Low-intensity Residential Services Level 3.3  CLINICALLY Managed Population- Specific High- Intensity Residential Services Level 3.5  CLINICALLY Managed High-Intensive Residential Services Level 3.7  MEDICALLY Monitored Intensive Inpatient Services Level 4  MEDICALLY Managed Intensive Inpatient Services   Dimension 4  Readiness  To  Change [] Ready for recovery but needs motivating and monitoring strategies to strengthen readiness    []Needs ongoing monitoring and disease management    [] High severity in this dimension but not in other dimensions. Needs Level 1 motivational enhancement strategies   [x] Has variable engagement in treatment, ambivalence, or lack of awareness of substance use or mental health problems and requires structured program several times/wk. to promote progress through stages of change [] Has poor engagement in treatment, significant ambivalence, or lack of awareness of substance use or mental health problems, requires near daily structured program or intensive engagement to promote progress through stages of change [] Open to recovery, but needs structured environment to maintain therapeutic gains [] Has little awareness & needs interventions at Level 3.3 to engage & stay in treatment.    [] If there is high severity in this dimension, but not in any other dimension, motivational enhancement strategies should be provided in Level 1 [] Has marked difficulty with, or opposition to treatment with dangerous consequences    [] If there is high severity in this dimension, but not in any other dimension, motivational enhancement strategies should be provided in Level 1 [] Low interest in treatment and impulse control is poor despite negative consequences;  needs motivating strategies only safely available in 24-hour structured

## 2024-02-26 ENCOUNTER — HOSPITAL ENCOUNTER (OUTPATIENT)
Dept: PSYCHIATRY | Age: 29
Setting detail: THERAPIES SERIES
Discharge: HOME | End: 2024-02-26
Payer: COMMERCIAL

## 2024-02-26 PROCEDURE — 80305 DRUG TEST PRSMV DIR OPT OBS: CPT

## 2024-02-26 PROCEDURE — H0015 ALCOHOL AND/OR DRUG SERVICES: HCPCS

## 2024-02-26 NOTE — FLOWSHEET NOTE
Dedrick did not attend the second group.      Electronically signed by LISSET Barger, LIN on 2/26/2024 at 4:22 PM

## 2024-02-26 NOTE — GROUP NOTE
Group Therapy Note    Date: 2/26/2024    Group Start Time:  9:00 AM  Group End Time: 10:30 AM  Group Topic: Psychotherapy    [facility]    Vamshi Reid MSW, JEMIMAW    Topic: daily check in and weekend report  Mode of intervention: altruism, group cohesion, existential factors, imparting of information, self understanding, and existential factors.     Note: Dedrick was active in the group. He noted he last used cocaine 3 weeks ago. He used MJ last week, impulse control and anger is triggering his use. He does crave MJ. He plans to make sober meetings this week. The group focused on anger management as making an intervention before the anger controls you.    Status after intervention:Improved  Participation level: Active Listener and Interactive  Participation Quality: Appropriate, Attentive, and Sharing  Speech: normal  Thought Process/Content:Logical  Mood/Affect: calm and congruent  Self report: None Reported  Response to learning: Able to verbalize current knowledge/experience, Able to verbalize/acknowledge new learning, and Able to retain information  Discipline Responsible: /Counselor    [x] Check in completed and reviewed.    Intervention required. no       Signature:  LISSET Barger, LIN

## 2024-02-27 ENCOUNTER — HOSPITAL ENCOUNTER (OUTPATIENT)
Dept: PSYCHIATRY | Age: 29
Setting detail: THERAPIES SERIES
Discharge: HOME | End: 2024-02-27
Payer: COMMERCIAL

## 2024-02-27 NOTE — FLOWSHEET NOTE
Patient did not attend class today.    Electronically signed by LISSET Barger, LIN on 2/27/2024 at 1:29 PM

## 2024-02-29 ENCOUNTER — HOSPITAL ENCOUNTER (OUTPATIENT)
Dept: PSYCHIATRY | Age: 29
Setting detail: THERAPIES SERIES
Discharge: HOME | End: 2024-02-29
Payer: COMMERCIAL

## 2024-02-29 NOTE — FLOWSHEET NOTE
Patient called off due to a job interview.    Electronically signed by LISSET Barger, JEMIMAW on 2/29/2024 at 1:47 PM

## 2024-03-08 NOTE — FLOWSHEET NOTE
On 3-8-24 HERIBERTO spoke with patient's PO Ms. Ramirez from [facility] noting he has not appeared for treatment and we are discharging him. She noted she had called him later in the week and he had not called her back. HERIBERTO advised her that we sent two positive screens for cocaine and MJ to her earlier in the week.    Electronically signed by LISSET Barger, LIN on 3/8/2024 at 5:05 PM

## 2024-03-11 ENCOUNTER — HOSPITAL ENCOUNTER (OUTPATIENT)
Dept: PSYCHIATRY | Age: 29
Setting detail: THERAPIES SERIES
Discharge: HOME | End: 2024-03-11

## 2024-03-11 NOTE — DISCHARGE SUMMARY
or Other Services: The client attended only two full sessions and two half sessions. He was administratively discharged on 3-8-24 for non-compliance. His  Dayanna Ramirez from [facility] was notified by phone and letter.      Staff Signature/Credentials/Date: Electronically signed by LISSET Barger, LIN on 3/11/2024 at 3:47 PM                                                                 Vamshi BAIG

## 2024-03-21 ENCOUNTER — HOSPITAL ENCOUNTER (OUTPATIENT)
Dept: PSYCHIATRY | Age: 29
Discharge: HOME | End: 2024-03-21

## 2024-03-21 NOTE — GROUP NOTE
Group Therapy Note    Date: 3/21/2024    Group Start Time:  8:45 AM  Group End Time: 10:30 AM  Group Topic: Psychotherapy    [facility]    Vamshi Reid MSW, JEMIMAW    Topic: Daily check in and self report  Mode of intervention: self understanding, catharsis, imparting of information, existential factors, group cohesion,and  interpersonal learning.     Note: Dedrick returned to group after a short hiatus. Group rules and HIPAA were explained. He uses MJ and used this week. He was encouraged to attend sober meetings. He has secured a job which has improved his self esteem. His employer is working with is schedule to allow him to attend class. He had a bright affect and stable mood. He had good participation in class.    Status after intervention:Improved  Participation level: Active Listener and Interactive  Participation Quality: Appropriate  Speech: normal  Thought Process/Content:Logical  Mood/Affect: calm and congruent  Self report: None Reported  Response to learning: Able to verbalize current knowledge/experience, Able to verbalize/acknowledge new learning, Able to retain information, and Capable of insight  Discipline Responsible: /Counselor    [x] Check in completed and reviewed.    Intervention required. no       Signature:  LISSET Barger, JEMIMAW

## 2024-03-22 NOTE — GROUP NOTE
Group Therapy Note    Date: 3/21/2024    Group Start Time: 10:45 AM  Group End Time: 12:00 PM  Group Topic: Psychoeducation    [facility]    Vamshi Reid MSW, JEMIMAW    Topic: analyzing the educational film \"Addiction\": the causes, treatment and scientific breakthroughs.  Mode of intervention: socialization, group cohesion, instillation of hope, self understanding, and imparting of information.     Note: Dedrick brought up how MAT helped to assist others to wean off of the Heroin. In addition to the dangers of using opiates which may result in an overdose.    Status after intervention:Unchanged  Participation level: Active Listener and Interactive  Participation Quality: Appropriate, Attentive, and Sharing  Speech: normal  Thought Process/Content:Logical  Mood/Affect: calm and congruent  Self report: None Reported  Response to learning: Able to verbalize current knowledge/experience, Able to verbalize/acknowledge new learning, and Able to retain information  Discipline Responsible: /Counselor    [x] Check in completed and reviewed.    Intervention required. no       Signature:  LISSET Barger, LIN

## 2024-03-22 NOTE — CARE COORDINATION
[facility]  Assessment Update     This Update is completed due to patient being assessed within the past 90 days at [facility]    Client name:  Dedrick Rodriguez    Date of Initial Assessment:   Date of update: 3/22/2024    Updated Information:  What type of discharge was last admission at [facility]?  [x]Administrative  []Against Medical Advice  []Higher Level of Care  []Completed  [] Other     Why is the client returning to treatment at this time?   He has a job and they have been working with him related to his schedule and it permits him to return to class.    What is current alcohol and drug usage (include all drugs with date of last use, frequency, amount and route of administration)?    Drug Date of last use Frequency of use Amount Route of admin.   cannabis 3-15-24 Daily or close to daily A Zoe smoking                                          Has client attended any self-help/support meetings since discharge?      [x]NO    []YES  If YES, how many meetings per week?         What has changed with the client's living situation and support system? none    What has changed with the client's physical and mental health, including [place] updates and medications?   none     Is there current suicidal ideation?   [x] No       [] Yes       If YES, what action plan was taken?    What has changed with the client's legal history since last admission? He continues on probation    Updated Diagnostic Impression (list all current drugs with diagnosis code):  F12.10  F14.20  F13.20 in full sustained remission    Update Checklist:    [x]    Release(s) of information  [x]    Consent to treatment  []    Electronic admissions forms  [x]    Insurance information (including ID, change of address, phone)  []    New treatment goal(s); if yes, add to treatment plan      Counselor Completing: Vamshi Reid, MSW, LSW                                        3/22/2024

## 2024-03-26 ENCOUNTER — HOSPITAL ENCOUNTER (OUTPATIENT)
Dept: PSYCHIATRY | Age: 29
Discharge: HOME | End: 2024-03-26

## 2024-03-26 NOTE — FLOWSHEET NOTE
Dedrick did not show for class. Shanice called him to discuss his insurance, His phone is not accepting calls.    Electronically signed by LISSET Barger, JEMIMAW on 3/26/2024 at 1:58 PM

## 2024-03-28 NOTE — FLOWSHEET NOTE
Dedrick did not show for class on 3-28-24.    Electronically signed by LISSET Barger LSW on 3/28/2024 at 4:22 PM

## 2024-04-02 ENCOUNTER — HOSPITAL ENCOUNTER (OUTPATIENT)
Dept: PSYCHIATRY | Age: 29
Discharge: HOME | End: 2024-04-02

## 2024-04-03 NOTE — CARE COORDINATION
Client discussed in treatment team today.    Present:     Dr. Reid, Vamshi BAIG, Kary WU, Jeny ONOFREW, [employer] RN student, Ev Grimaldo coordinator, and Candi WU. On 4-2-24.       Current Status: patient last attended on 3-22-24. He called recently reporting being exposed to COVID. He needs to be consistent in attendance and make sober meetings,    Treatment goals:    [] Relapse prevention  [x] Increase sober support  [x] Increase attendance in sober support groups  [x] Madeline effectively with cravings and urges  [] Other:     Recommendation: contipedro in Dual IOP

## 2024-04-03 NOTE — PLAN OF CARE
[facility]- Level of Care Placement      []Admissions  [x]Continued Stay []Discharge/Transfer / Complication in [place] Date:4/3/2024    Client Sticker      Level of Care Level 1      Outpatient Services Level 2.1   Intensive Outpatient Services(IOP) Level 2.5   Partial Hospitalization Services Level 3.1 CLINICALLY Managed Low-Intensity Residential Services Level 3.3  CLINICALLY Managed Population- Specific High- Intensity Residential Services Level 3.5  CLINICALLY Managed High Intensive Residential Services Level 3.7  MEDICALLY Monitored Intensive Inpatient Services Level 4  MEDICALLY Managed Intensive Inpatient Services   Dimension 1  Acute Intoxication and/or Withdrawal Potential [] Not experiencing significant withdrawal    [] Minimal  risk of severe  withdrawal [x] Minimal  risk of severe  withdrawal    [] Manageable  at Level 2-WM [] Moderate  risk of severe withdrawal    [] Manageable at Level 2-WM [] No withdrawal risk or minimal or stable withdrawal     [] Concurrently receiving Level -WM or Level 2-WM services [] Minimal risk of severe withdrawal    [] If withdrawal is present, manageable at Level 3.2-WM  [] Minimal risk of severe withdrawal    [] If withdrawal is present manageable at Level 3.2-WM [] High risk of withdrawal, but manageable at Level  3.7-WM and does not require  full resources of a licensed hospital [] At high risk of withdrawal and requires Level 4-WM and full resources of licensed hospital    COMMENTS:           Dimension 2   Biomedical Conditions and Complications  ([facility]/C) [x] None or very stable    [] Receiving concurrent medical monitoring  [] None or not a distraction from treatment    [] Problems are manageable at Level 2.1 [] None or not sufficient to distract from treatment    [] Problems are manageable at  Level 2.5 [] None or stable    [] Receiving concurrent medical monitoring  [] None or stable    [] Receiving concurrent medical

## 2024-04-04 ENCOUNTER — HOSPITAL ENCOUNTER (OUTPATIENT)
Dept: PSYCHIATRY | Age: 29
Discharge: HOME | End: 2024-04-04

## 2024-04-04 NOTE — FLOWSHEET NOTE
SW scope with salvatore. He noted he has been sick and isolating because he has COVID. He believes he will be here on Monday.    Electronically signed by LISSET Barger, JEMIMAW on 4/4/2024 at 4:20 PM

## 2024-04-08 ENCOUNTER — HOSPITAL ENCOUNTER (OUTPATIENT)
Dept: PSYCHIATRY | Age: 29
Discharge: HOME | End: 2024-04-08

## 2024-04-09 ENCOUNTER — HOSPITAL ENCOUNTER (OUTPATIENT)
Dept: PSYCHIATRY | Age: 29
Discharge: HOME | End: 2024-04-09

## 2024-04-11 ENCOUNTER — HOSPITAL ENCOUNTER (OUTPATIENT)
Dept: PSYCHIATRY | Age: 29
Discharge: HOME | End: 2024-04-11

## 2024-04-11 NOTE — FLOWSHEET NOTE
Dedrick was not present for class today.      Electronically signed by     LISSET Barger, LIN on 4/11/2024 at 1:04 PM

## 2024-04-29 ENCOUNTER — HOSPITAL ENCOUNTER (OUTPATIENT)
Dept: PSYCHIATRY | Age: 29
Discharge: HOME | End: 2024-04-29

## 2024-04-29 NOTE — DISCHARGE SUMMARY
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, and F15.20   Authorized Person's Name/License/Date: Vamshi BAIG  Degree of Severity - Admission None Low Moderate High Degree of Severity - Discharge None Low Moderate High   Acute Intoxication withdrawal X    Acute Intoxication withdrawal X      Biomedical Conditions/Complications X    Biomedical Conditions/Complications X      Emotional Behavioral/Cognitive Conditions X    Emotional Behavioral/Cognitive Conditions   X    Treatment Acceptance Resistance X    Treatment Acceptance Resistance    X   Relapse Potential   X    Relapse Potential    X   Recovery Environment   X    Recovery Environment    X     Comments on Dimensional Criteria: #1 Dedrick reported no withdraws on admission or discharge. #2 Client reported no medical conditions during treatment. #3 Client's mental health was treated during her time in the Dual program through mental health therapy and Dr. Cash.  #4 Client demonstrated high resistance to the program, as he was not able to accept treatment and did not utilize the group process effectively. # 5 Client was not able to attend the (2) required AA/NA meetings for treatment requirement.  #6 His relapse potential is high because the client reports are substances in her environment.    Level of Care and Service Provided during Course of Treatment: Based upon the results of the assessment, which included completion of the admission criteria of the adult protocol for levels of care, services provider was [facility], and group therapy.     Client's Response to Treatment: Client was not actively involved in treatment while he attended class.    Recommendations and/or Referral for Additional AOD Addiction Treatment or Other Services: Client was administratively discharged on 3-16-24 for non-compliance. He is recommended